# Patient Record
Sex: MALE | Race: BLACK OR AFRICAN AMERICAN | NOT HISPANIC OR LATINO | Employment: FULL TIME | URBAN - METROPOLITAN AREA
[De-identification: names, ages, dates, MRNs, and addresses within clinical notes are randomized per-mention and may not be internally consistent; named-entity substitution may affect disease eponyms.]

---

## 2017-01-10 ENCOUNTER — TELEPHONE (OUTPATIENT)
Dept: INTERNAL MEDICINE | Facility: CLINIC | Age: 52
End: 2017-01-10

## 2017-01-10 DIAGNOSIS — I10 ESSENTIAL HYPERTENSION: Primary | ICD-10-CM

## 2017-08-11 ENCOUNTER — HOSPITAL ENCOUNTER (EMERGENCY)
Facility: OTHER | Age: 52
Discharge: HOME | End: 2017-08-11
Attending: EMERGENCY MEDICINE
Payer: COMMERCIAL

## 2017-08-11 VITALS
BODY MASS INDEX: 25.9 KG/M2 | RESPIRATION RATE: 16 BRPM | SYSTOLIC BLOOD PRESSURE: 137 MMHG | WEIGHT: 165 LBS | TEMPERATURE: 98 F | DIASTOLIC BLOOD PRESSURE: 73 MMHG | HEART RATE: 66 BPM | HEIGHT: 67 IN | OXYGEN SATURATION: 97 %

## 2017-08-11 DIAGNOSIS — K50.019: Primary | ICD-10-CM

## 2017-08-11 LAB
ALBUMIN SERPL BCP-MCNC: 3.9 G/DL
ALP SERPL-CCNC: 66 U/L
ALT SERPL W/O P-5'-P-CCNC: 21 U/L
ANION GAP SERPL CALC-SCNC: 10 MMOL/L
AST SERPL-CCNC: 19 U/L
BASOPHILS # BLD AUTO: 0.01 K/UL
BASOPHILS NFR BLD: 0.1 %
BILIRUB SERPL-MCNC: 1.4 MG/DL
BILIRUB UR QL STRIP: NEGATIVE
BUN SERPL-MCNC: 12 MG/DL
CALCIUM SERPL-MCNC: 9.6 MG/DL
CHLORIDE SERPL-SCNC: 104 MMOL/L
CLARITY UR: CLEAR
CO2 SERPL-SCNC: 26 MMOL/L
COLOR UR: YELLOW
CREAT SERPL-MCNC: 1 MG/DL
CRP SERPL-MCNC: 32.6 MG/L
DIFFERENTIAL METHOD: ABNORMAL
EOSINOPHIL # BLD AUTO: 0 K/UL
EOSINOPHIL NFR BLD: 0.2 %
ERYTHROCYTE [DISTWIDTH] IN BLOOD BY AUTOMATED COUNT: 13.2 %
EST. GFR  (AFRICAN AMERICAN): >60 ML/MIN/1.73 M^2
EST. GFR  (NON AFRICAN AMERICAN): >60 ML/MIN/1.73 M^2
GLUCOSE SERPL-MCNC: 110 MG/DL
GLUCOSE UR QL STRIP: NEGATIVE
HCT VFR BLD AUTO: 47.5 %
HGB BLD-MCNC: 16.6 G/DL
HGB UR QL STRIP: ABNORMAL
KETONES UR QL STRIP: NEGATIVE
LEUKOCYTE ESTERASE UR QL STRIP: NEGATIVE
LIPASE SERPL-CCNC: 7 U/L
LYMPHOCYTES # BLD AUTO: 0.5 K/UL
LYMPHOCYTES NFR BLD: 4.1 %
MCH RBC QN AUTO: 31.5 PG
MCHC RBC AUTO-ENTMCNC: 34.9 G/DL
MCV RBC AUTO: 90 FL
MONOCYTES # BLD AUTO: 1 K/UL
MONOCYTES NFR BLD: 9.4 %
NEUTROPHILS # BLD AUTO: 9.3 K/UL
NEUTROPHILS NFR BLD: 85.9 %
NITRITE UR QL STRIP: NEGATIVE
PH UR STRIP: 6 [PH] (ref 5–8)
PLATELET # BLD AUTO: 179 K/UL
PMV BLD AUTO: 11.1 FL
POTASSIUM SERPL-SCNC: 4 MMOL/L
PROT SERPL-MCNC: 7.3 G/DL
PROT UR QL STRIP: NEGATIVE
RBC # BLD AUTO: 5.27 M/UL
SODIUM SERPL-SCNC: 140 MMOL/L
SP GR UR STRIP: 1.02 (ref 1–1.03)
URN SPEC COLLECT METH UR: ABNORMAL
UROBILINOGEN UR STRIP-ACNC: NEGATIVE EU/DL
WBC # BLD AUTO: 10.85 K/UL

## 2017-08-11 PROCEDURE — 25000003 PHARM REV CODE 250: Performed by: EMERGENCY MEDICINE

## 2017-08-11 PROCEDURE — 99284 EMERGENCY DEPT VISIT MOD MDM: CPT | Mod: 25

## 2017-08-11 PROCEDURE — 83690 ASSAY OF LIPASE: CPT

## 2017-08-11 PROCEDURE — 81003 URINALYSIS AUTO W/O SCOPE: CPT

## 2017-08-11 PROCEDURE — 85025 COMPLETE CBC W/AUTO DIFF WBC: CPT

## 2017-08-11 PROCEDURE — 63600175 PHARM REV CODE 636 W HCPCS: Performed by: EMERGENCY MEDICINE

## 2017-08-11 PROCEDURE — 25500020 PHARM REV CODE 255: Performed by: EMERGENCY MEDICINE

## 2017-08-11 PROCEDURE — 96374 THER/PROPH/DIAG INJ IV PUSH: CPT

## 2017-08-11 PROCEDURE — 80053 COMPREHEN METABOLIC PANEL: CPT

## 2017-08-11 PROCEDURE — 86140 C-REACTIVE PROTEIN: CPT

## 2017-08-11 PROCEDURE — 96375 TX/PRO/DX INJ NEW DRUG ADDON: CPT

## 2017-08-11 PROCEDURE — 96361 HYDRATE IV INFUSION ADD-ON: CPT

## 2017-08-11 RX ORDER — HYDROCODONE BITARTRATE AND ACETAMINOPHEN 5; 325 MG/1; MG/1
1 TABLET ORAL EVERY 4 HOURS PRN
Qty: 18 TABLET | Refills: 0 | Status: SHIPPED | OUTPATIENT
Start: 2017-08-11 | End: 2022-06-23

## 2017-08-11 RX ORDER — METRONIDAZOLE 500 MG/1
500 TABLET ORAL 3 TIMES DAILY
Qty: 21 TABLET | Refills: 0 | Status: SHIPPED | OUTPATIENT
Start: 2017-08-11 | End: 2017-08-18 | Stop reason: SDUPTHER

## 2017-08-11 RX ORDER — ONDANSETRON 2 MG/ML
8 INJECTION INTRAMUSCULAR; INTRAVENOUS
Status: COMPLETED | OUTPATIENT
Start: 2017-08-11 | End: 2017-08-11

## 2017-08-11 RX ORDER — MORPHINE SULFATE 2 MG/ML
4 INJECTION, SOLUTION INTRAMUSCULAR; INTRAVENOUS
Status: COMPLETED | OUTPATIENT
Start: 2017-08-11 | End: 2017-08-11

## 2017-08-11 RX ORDER — ONDANSETRON 4 MG/1
4 TABLET, ORALLY DISINTEGRATING ORAL EVERY 8 HOURS PRN
Qty: 12 TABLET | Refills: 0 | Status: SHIPPED | OUTPATIENT
Start: 2017-08-11 | End: 2022-06-23

## 2017-08-11 RX ORDER — CIPROFLOXACIN 500 MG/1
500 TABLET ORAL 2 TIMES DAILY
Qty: 14 TABLET | Refills: 0 | Status: SHIPPED | OUTPATIENT
Start: 2017-08-11 | End: 2017-08-18 | Stop reason: SDUPTHER

## 2017-08-11 RX ADMIN — MORPHINE SULFATE 4 MG: 2 INJECTION, SOLUTION INTRAMUSCULAR; INTRAVENOUS at 01:08

## 2017-08-11 RX ADMIN — ONDANSETRON 8 MG: 2 INJECTION INTRAMUSCULAR; INTRAVENOUS at 01:08

## 2017-08-11 RX ADMIN — SODIUM CHLORIDE 1000 ML: 0.9 INJECTION, SOLUTION INTRAVENOUS at 01:08

## 2017-08-11 RX ADMIN — IOHEXOL 75 ML: 350 INJECTION, SOLUTION INTRAVENOUS at 03:08

## 2017-08-11 NOTE — DISCHARGE INSTRUCTIONS
Your CAT scan showing inflammation of your ileum - this could represent a rare form of diverticulitis. It also could represent Crohn's disease, which is an inflammatory bowel disorder. You need to see a gastroenterologist to determine which of these it is. The antibiotics you were prescribed today will treat diverticulitis.

## 2017-08-11 NOTE — ED NOTES
Pt sitting in recliner. Pt watching tv. Pt states pain level 2/10. VS monitoring in progress. VS stable. Call light within reach. Cardiac monitoring in progress. Plan of care updated and discussed. Comfort needs addressed. Will continue to monitor.

## 2017-08-11 NOTE — ED TRIAGE NOTES
Pt c/o right sided abdominal pain since yesterday - states started about 0700 in the morning - states pain is constant and has progressively gotten worse - states pain 8/10 and aching in nature - states diarrhea yesterday

## 2017-08-11 NOTE — ED PROVIDER NOTES
"Encounter Date: 8/11/2017    SCRIBE #1 NOTE: I, Carrol Richard, am scribing for, and in the presence of, Dr. Curiel.       History     Chief Complaint   Patient presents with    Abdominal Pain     C/o RLQ abdominal pain onset yesterday. Denies any n/v, diarrhea, fever. States has had hx of similar symptoms, denies appendectomy.      Time seen by provider: 12:38 PM    This is a 52 y.o. male who presents with complaint of right-sided abdominal pain that has persisted since its onset yesterday morning.  The patient describes his discomfort as a constant, non-radiating sensation.  He also endorses mild nausea and 1 episode of diarrhea, which occurred prior to the onset of the pain and has since resolved.  He says there is no fever, chills, appetite changes, congestion, rhinorrhea, sore throat, cough, SOB, CP, vomiting, constipation, blood in stool, dysuria, or changes in urinary frequency or urgency.  The patient states that his discomfort is slightly worse with movement. There are no identifying, alleviating, or exacerbating factors.  He admits that he has not attempted to alleviate his discomfort with any OTC medication. Patient had a similar episode approximately 9 years ago, and he was diagnosed with an uncertain infection "near (his) appendix" at that time.  The patient reports further history of HTN and GERD.  He reports surgical history of left nephrectomy and colonoscopy.  He endorses no further complaints at this time.     Additional past medical, surgical, and social history as outlined in the nursing assessment was reviewed by me.       The history is provided by the patient.     Review of patient's allergies indicates:   Allergen Reactions    No known drug allergies      Past Medical History:   Diagnosis Date    GERD (gastroesophageal reflux disease)     Hx of unilateral nephrectomy     s/p Left Nephrectomy 12/2010/Benign cyst/inflammation    Hypertension     Tobacco abuse      Past Surgical History: "   Procedure Laterality Date    KIDNEY SURGERY  2012    kidney removed     Family History   Problem Relation Age of Onset    Hypertension Mother     Diabetes Mother     Hypertension Father      Social History   Substance Use Topics    Smoking status: Current Every Day Smoker     Packs/day: 0.50     Years: 35.00    Smokeless tobacco: Never Used      Comment: Considering such    Alcohol use No     Review of Systems   Constitutional: Negative for appetite change, chills and fever.   HENT: Negative for congestion, rhinorrhea and sore throat.    Respiratory: Negative for cough and shortness of breath.    Cardiovascular: Negative for chest pain.   Gastrointestinal: Positive for abdominal pain, diarrhea (Resolved) and nausea. Negative for blood in stool, constipation and vomiting.   Endocrine: Negative for polyuria.   Genitourinary: Negative for dysuria, frequency and urgency.   Musculoskeletal: Negative for back pain.   Skin: Negative for rash.   Allergic/Immunologic: Negative for immunocompromised state.   Neurological: Negative for dizziness and weakness.   Hematological: Does not bruise/bleed easily.   Psychiatric/Behavioral: Negative for confusion.       Physical Exam     Initial Vitals [08/11/17 1052]   BP Pulse Resp Temp SpO2   122/77 98 16 98.6 °F (37 °C) 97 %      MAP       92         Physical Exam    Nursing note and vitals reviewed.  Constitutional: Vital signs are normal. He appears well-developed and well-nourished. He is not diaphoretic. He does not appear ill. No distress.   HENT:   Head: Normocephalic and atraumatic.   Right Ear: External ear normal.   Left Ear: External ear normal.   Eyes: Conjunctivae and EOM are normal. Right eye exhibits no discharge. Left eye exhibits no discharge.   Neck: Normal range of motion. Neck supple. No tracheal deviation present.   Cardiovascular: Normal rate, regular rhythm, S1 normal, S2 normal, normal heart sounds and intact distal pulses. Exam reveals no gallop and  no friction rub.    No murmur heard.  Pulmonary/Chest: Breath sounds normal. No respiratory distress.   Abdominal: Soft. Bowel sounds are normal. He exhibits no distension and no mass. There is tenderness. There is guarding. There is no rebound.   Tenderness in the right lumbar and right iliac regions with guarding, but no rebound.     Musculoskeletal: Normal range of motion.   Normal range of motion. He exhibits no edema or tenderness.    Neurological: He is alert and oriented to person, place, and time. He has normal strength. No cranial nerve deficit. He exhibits normal muscle tone. Gait normal.   Skin: Skin is warm and dry. Capillary refill takes less than 2 seconds. No rash noted. No pallor.   Psychiatric: He has a normal mood and affect. His speech is normal. Thought content normal.         ED Course   Procedures  Labs Reviewed   URINALYSIS - Abnormal; Notable for the following:        Result Value    Occult Blood UA Trace (*)     All other components within normal limits   COMPREHENSIVE METABOLIC PANEL - Abnormal; Notable for the following:     Total Bilirubin 1.4 (*)     All other components within normal limits   CBC W/ AUTO DIFFERENTIAL - Abnormal; Notable for the following:     MCH 31.5 (*)     Gran # 9.3 (*)     Lymph # 0.5 (*)     Gran% 85.9 (*)     Lymph% 4.1 (*)     All other components within normal limits   C-REACTIVE PROTEIN - Abnormal; Notable for the following:     CRP 32.6 (*)     All other components within normal limits   LIPASE     Imaging Results          CT Abdomen Pelvis With Contrast (Final result)  Result time 08/11/17 16:11:14    Final result by Alexey Rodriguez MD (08/11/17 16:11:14)                 Impression:      1.  Inflammatory changes of the terminal ileum with probable microperforation.  Diagnostic considerations include ileal diverticulitis and less likely, inflammatory bowel disease.  2.  Thickening of the urinary bladder wall, possibly reflecting chronic outlet  obstruction.    Discussed with Dr. Curiel at 4:10 PM.      Electronically signed by: NAEL NAVA MD  Date:     08/11/17  Time:    16:11              Narrative:    CT abdomen and pelvis    Technique: Helical CT scan abdomen and pelvis performed with 75 mL Omnipaque 350 intravenous contrast.    Comparison: 6/2/08.    Findings:    Discoid atelectasis noted at the left lung base.  No pericardial or pleural effusion.    Liver demonstrates focal fatty infiltration along the falciform ligament and is otherwise unremarkable.  Gallbladder, pancreas, spleen, adrenal glands are unremarkable.  Left kidney is absent, status post left nephrectomy.  No masses or fluid collections in the operative bed.  Right kidney demonstrates several tiny cortical hypodense foci, too small to characterize.  No hydronephrosis.    GI tract is not obstructed.  Terminal ileum demonstrates wall thickening and significant inflammatory stranding.  There are several punctate foci of gas which may be extraluminal, suggesting microperforation.  No drainable abscess.  Although inflammatory stranding is present about the appendix and caliber is minimally increased at 8 mm, there is gas throughout the majority of the lumen and the wall does not appear thickened.  Diverticula are noted throughout the colon.    The urinary bladder demonstrates wall thickening though is under distended.    No retroperitoneal or mesenteric lymphadenopathy.  No ascites.  Abdominal aorta is normal caliber.    Regional osseous structures demonstrate no aggressive appearing lytic or blastic lesions.                                      Medical Decision Making:   History:   Old Medical Records: I decided to obtain old medical records.  Old Records Summarized: records from clinic visits, records from previous admission(s) and other records.  Initial Assessment:   Patient presents with RLQ pain.  There is tenderness on exam.  Differential include appendicitis, diverticulitis,  infectious colitis, and mesenteric adenitis.  Biliary tract disease is less likely.  I will Ct the abdomen.  I will give parenteral analgesia in addition to IVF.  I will reassess.   Clinical Tests:   Lab Tests: Ordered and Reviewed  Radiological Study: Ordered and Reviewed  ED Management:  4:40 PM. Patient is feeling much better and is comfortable appearing.  His CT shows terminal ileitis.  While this could be a rare diverticulitis, given his similar symptom 9 years ago I dicussed with patient the possibility of Crohn's disease.  He believes his mother may have had IBD but he is unsure. I will give antibiotics for diverticulitis but I have stressed the need to see GI for definitive diagnosis. Patient wants to go home at this time.  I have discussed with patient the diagnostic results, diagnosis, treatment plan, and need for follow-up. Patient has expressed understanding of my instructions. I am comfortable with his discharge home at this time.             Scribe Attestation:   Scribe #1: I performed the above scribed service and the documentation accurately describes the services I performed. I attest to the accuracy of the note.    Attending Attestation:           Physician Attestation for Scribe:  Physician Attestation Statement for Scribe #1: I, Dr. Curiel, reviewed documentation, as scribed by Carrol Richard in my presence, and it is both accurate and complete.                 ED Course     Clinical Impression:     1. Terminal ileitis of small intestine, unspecified complication                                 Genesis Curiel MD  08/11/17 7186

## 2017-08-11 NOTE — ED NOTES
Pt sitting in recliner. Wife at side. Pt states pain level 3/10. Restroom and comfort needs addressed. VS monitoring in progress. Plan of care updated and discussed. Will continue to monitor.

## 2017-08-18 ENCOUNTER — OFFICE VISIT (OUTPATIENT)
Dept: INTERNAL MEDICINE | Facility: CLINIC | Age: 52
End: 2017-08-18
Payer: COMMERCIAL

## 2017-08-18 VITALS
BODY MASS INDEX: 24.6 KG/M2 | WEIGHT: 156.75 LBS | DIASTOLIC BLOOD PRESSURE: 78 MMHG | HEART RATE: 84 BPM | SYSTOLIC BLOOD PRESSURE: 118 MMHG | HEIGHT: 67 IN

## 2017-08-18 DIAGNOSIS — K57.10 DIVERTICULOSIS OF SMALL INTESTINE WITHOUT HEMORRHAGE: ICD-10-CM

## 2017-08-18 DIAGNOSIS — I10 ESSENTIAL HYPERTENSION: Chronic | ICD-10-CM

## 2017-08-18 DIAGNOSIS — F17.201 TOBACCO ABUSE, IN REMISSION: Primary | ICD-10-CM

## 2017-08-18 PROCEDURE — 99999 PR PBB SHADOW E&M-EST. PATIENT-LVL III: CPT | Mod: PBBFAC,,, | Performed by: INTERNAL MEDICINE

## 2017-08-18 PROCEDURE — 3008F BODY MASS INDEX DOCD: CPT | Mod: S$GLB,,, | Performed by: INTERNAL MEDICINE

## 2017-08-18 PROCEDURE — 3078F DIAST BP <80 MM HG: CPT | Mod: S$GLB,,, | Performed by: INTERNAL MEDICINE

## 2017-08-18 PROCEDURE — 3074F SYST BP LT 130 MM HG: CPT | Mod: S$GLB,,, | Performed by: INTERNAL MEDICINE

## 2017-08-18 PROCEDURE — 99214 OFFICE O/P EST MOD 30 MIN: CPT | Mod: S$GLB,,, | Performed by: INTERNAL MEDICINE

## 2017-08-18 RX ORDER — CIPROFLOXACIN 500 MG/1
500 TABLET ORAL 2 TIMES DAILY
Qty: 14 TABLET | Refills: 0 | Status: SHIPPED | OUTPATIENT
Start: 2017-08-18 | End: 2017-08-25

## 2017-08-18 RX ORDER — METRONIDAZOLE 500 MG/1
500 TABLET ORAL EVERY 12 HOURS
Qty: 14 TABLET | Refills: 0 | Status: SHIPPED | OUTPATIENT
Start: 2017-08-18 | End: 2017-08-25

## 2017-08-18 NOTE — PATIENT INSTRUCTIONS
Diverticulitis    Some people get pouches along the wall of the colon as they get older. The pouches, called diverticuli, usually cause no symptoms. If the pouches become blocked, you can get an infection. This infection is called diverticulitis. It causes pain in your lower abdomen and fever. If not treated, it can become a serious condition, causing an abscess to form inside the pouch. The abscess may block the intestinal tract even or rupture, spreading infection throughout the abdomen.  When treatment is started early, oral antibiotics alone may be enough to cure diverticulitis. This method is tried first. But, if you don't improve or if your condition gets worse while using oral antibiotics, you may need to be admitted to the hospital for IV antibiotics. Severe cases may require surgery.  Home care  The following guidelines will help you care for yourself at home:  · During the acute illness, rest and follow your healthcare provider's instructions about diet. Sometimes you will need to follow a clear liquid diet to rest your bowel. Once your symptoms are better, you may be told to follow a low-fiber diet for some time. Include foods like:  ¨ Flake cereal, mashed potatoes, pancakes, waffles, pasta, white bread, rice, applesauce, bananas, eggs, fish, poultry, tofu, and cooked soft vegetables  · Take antibiotics exactly as instructed. Don't miss any doses or stop taking the medication, even if you feel better.  · Monitor your temperature and tell your healthcare provider if you have rising temperatures.  Preventing future attacks  Once you have an episode of diverticulitis, you are at risk for having it again. After you have recovered from this episode, you may be able to lower your risk by eating a high-fiber diet (20 gm/day to 35 gm/day of fiber). This cleans out the colon pouches that already exist and may prevent new ones from forming. Foods high in fiber include fresh fruits and edible peelings, raw or  lightly cooked vegetables, whole grain cereals and breads, dried beans and peas, and bran.  Other steps that can help prevent future attacks include:  · Take your medicines, such as antibiotics, as your healthcare provider says.  · Drink 6 to 8 glasses of water every day, unless told otherwise.  · Use a heating pad or hot water bottle to help abdominal cramping or pain.  · Begin an exercise program. Ask your healthcare provider how to get started. You can benefit from simple activities such as walking or gardening.  · Treat diarrhea with a bland diet. Start with liquids only; then slowly add fiber over time.  · Watch for changes in your bowel movements (constipation to diarrhea). Avoid constipation by eating a high fiber diet and taking a stool softener if needed.  · Get plenty of rest and sleep.  Follow-up care  Follow up with your healthcare provider as advised or sooner if you are not getting better in the next 2 days.  When to seek medical advice  Call your healthcare provider right away if any of these occur:  · Fever of 100.4°F (38°C) or higher, or as directed by your healthcare provider  · Repeated vomiting or swelling of the abdomen  · Weakness, dizziness, light-headedness  · Pain in your abdomen that gets worse, severe, or spreads to your back  · Pain that moves to the right lower abdomen  · Rectal bleeding (stools that are red, black or maroon color)  · Unexpected vaginal bleeding  Date Last Reviewed: 9/1/2016  © 3645-0428 [employer]. [address] [place] [address], [place] [address]. All rights reserved. This information is not intended as a substitute for professional medical care. Always follow your healthcare professional's instructions.

## 2017-08-19 NOTE — PROGRESS NOTES
Subjective:       Patient ID: Kevin Urbina is a 52 y.o. male.    Chief Complaint: Hospital Follow up Diverticulitis    HPI Pt presents s/p ER visit with RLQ abdomen pain 8/11.  He was dx'd with Terminal ileitis/diverticulitis  After Ct Abdomen/lab/UA performed. He was given a 1 week coarse of Flagyl/Ciprofloxacin, which he has taken.  He is better but still has some occasional discomfort RLQ; he has no F/C,no N/V, no diarrhea.    Current Outpatient Prescriptions   Medication Sig Dispense Refill    ciprofloxacin HCl (CIPRO) 500 MG tablet Take 1 tablet (500 mg total) by mouth 2 (two) times daily. 14 tablet 0    hydrocodone-acetaminophen 5-325mg (NORCO) 5-325 mg per tablet Take 1 tablet by mouth every 4 (four) hours as needed for Pain. 18 tablet 0    metronidazole (FLAGYL) 500 MG tablet Take 1 tablet (500 mg total) by mouth every 12 (twelve) hours. 14 tablet 0    ondansetron (ZOFRAN-ODT) 4 MG TbDL Take 1 tablet (4 mg total) by mouth every 8 (eight) hours as needed (nausea). 12 tablet 0    wheat dextrin (BENEFIBER CLEAR SF, DEXTRIN,) 3 gram/3.5 gram PwPk 1 packet daily in 6-8 oz fluid/water daily for Constipation 30 packet prn     No current facility-administered medications for this visit.        Review of Systems   Constitutional: Negative for chills and fever.   Gastrointestinal: Positive for abdominal pain.        +/See HPI   Neurological: Negative for weakness.       Objective:      Lab Results   Component Value Date    WBC 10.85 08/11/2017    HGB 16.6 08/11/2017    HCT 47.5 08/11/2017     08/11/2017    CHOL 193 05/26/2016    TRIG 179 (H) 04/08/2015    HDL 38 (L) 04/08/2015    ALT 21 08/11/2017    AST 19 08/11/2017     08/11/2017    K 4.0 08/11/2017     08/11/2017    CREATININE 1.0 08/11/2017    BUN 12 08/11/2017    CO2 26 08/11/2017    TSH 1.822 05/26/2016    PSA 0.44 05/26/2016    INR 1.0 11/18/2010    HGBA1C 5.0 05/26/2016     Physical Exam   Constitutional: He appears well-developed  and well-nourished.   HENT:   Head: Normocephalic and atraumatic.   Cardiovascular: Normal rate, regular rhythm and normal heart sounds.    Pulmonary/Chest: Effort normal and breath sounds normal.   Abdominal: Soft. Bowel sounds are normal. There is no tenderness. There is guarding.   Musculoskeletal: He exhibits no edema.   Skin: Skin is warm and dry.   Vitals reviewed.      Assessment:       1. Tobacco abuse, in remission    2. Diverticulosis of small intestine without hemorrhage    3. Essential hypertension        Plan:     Health Maintenance       Date Due Completion Date    Pneumococcal PPSV23 (Medium Risk) (1) 03/01/1981 ---    Influenza Vaccine 08/01/2017 ---    Lipid Panel 05/26/2021 5/26/2016    Colonoscopy 03/25/2024 3/25/2014    TETANUS VACCINE 04/08/2025 4/8/2015        Kevin was seen today for hospital follow up and diverticulitis.    Diagnoses and all orders for this visit:      Diverticulosis of small intestine without hemorrhage/improved-not resolved  -     ciprofloxacin HCl (CIPRO) 500 MG tablet; Take 1 tablet (500 mg total) by mouth 2 (two) times daily x add'l week.  -     metronidazole (FLAGYL) 500 MG tablet; Take 1 tablet (500 mg total) by mouth every 12 (twelve) hours x add'l week.  -     wheat dextrin (BENEFIBER CLEAR SF, DEXTRIN,) 3 gram/3.5 gram PwPk; 1 packet daily in 6-8 oz fluid/water daily for Constipation        -     Diverticulosis diet discussed at length and info provided    Essential hypertension/controlled with diet    Tobacco abuse        -    Tobacco cessation discussed at length/pt agreeable to trial of D/C

## 2018-05-09 DIAGNOSIS — M25.569 KNEE PAIN, UNSPECIFIED CHRONICITY, UNSPECIFIED LATERALITY: Primary | ICD-10-CM

## 2018-07-12 ENCOUNTER — TELEPHONE (OUTPATIENT)
Dept: INTERNAL MEDICINE | Facility: CLINIC | Age: 53
End: 2018-07-12

## 2018-07-12 NOTE — TELEPHONE ENCOUNTER
Jimmy Foy Staff   Caller: self/[phone] (Today,  1:09 PM)             Pt is calling to try and get an apt with Dr.St Sánchez. Pt is still having pain in his right knee and a bad cough. Please advise.

## 2019-05-22 ENCOUNTER — OFFICE VISIT (OUTPATIENT)
Dept: INTERNAL MEDICINE | Facility: CLINIC | Age: 54
End: 2019-05-22
Payer: COMMERCIAL

## 2019-05-22 VITALS
HEIGHT: 67 IN | WEIGHT: 159.63 LBS | SYSTOLIC BLOOD PRESSURE: 150 MMHG | TEMPERATURE: 98 F | DIASTOLIC BLOOD PRESSURE: 100 MMHG | OXYGEN SATURATION: 98 % | HEART RATE: 90 BPM | BODY MASS INDEX: 25.06 KG/M2

## 2019-05-22 DIAGNOSIS — Z00.00 ANNUAL PHYSICAL EXAM: Primary | ICD-10-CM

## 2019-05-22 DIAGNOSIS — K63.5 POLYP OF COLON, UNSPECIFIED PART OF COLON, UNSPECIFIED TYPE: ICD-10-CM

## 2019-05-22 DIAGNOSIS — F17.201 TOBACCO ABUSE, IN REMISSION: ICD-10-CM

## 2019-05-22 DIAGNOSIS — I10 ESSENTIAL HYPERTENSION: Chronic | ICD-10-CM

## 2019-05-22 DIAGNOSIS — K13.79 LESION OF SOFT PALATE: ICD-10-CM

## 2019-05-22 PROCEDURE — 99396 PREV VISIT EST AGE 40-64: CPT | Mod: S$GLB,,, | Performed by: INTERNAL MEDICINE

## 2019-05-22 PROCEDURE — 99999 PR PBB SHADOW E&M-EST. PATIENT-LVL IV: ICD-10-PCS | Mod: PBBFAC,,, | Performed by: INTERNAL MEDICINE

## 2019-05-22 PROCEDURE — 99396 PR PREVENTIVE VISIT,EST,40-64: ICD-10-PCS | Mod: S$GLB,,, | Performed by: INTERNAL MEDICINE

## 2019-05-22 PROCEDURE — 99999 PR PBB SHADOW E&M-EST. PATIENT-LVL IV: CPT | Mod: PBBFAC,,, | Performed by: INTERNAL MEDICINE

## 2019-05-22 PROCEDURE — 3080F DIAST BP >= 90 MM HG: CPT | Mod: CPTII,S$GLB,, | Performed by: INTERNAL MEDICINE

## 2019-05-22 PROCEDURE — 3080F PR MOST RECENT DIASTOLIC BLOOD PRESSURE >= 90 MM HG: ICD-10-PCS | Mod: CPTII,S$GLB,, | Performed by: INTERNAL MEDICINE

## 2019-05-22 PROCEDURE — 3077F SYST BP >= 140 MM HG: CPT | Mod: CPTII,S$GLB,, | Performed by: INTERNAL MEDICINE

## 2019-05-22 PROCEDURE — 3077F PR MOST RECENT SYSTOLIC BLOOD PRESSURE >= 140 MM HG: ICD-10-PCS | Mod: CPTII,S$GLB,, | Performed by: INTERNAL MEDICINE

## 2019-05-22 RX ORDER — LOSARTAN POTASSIUM 50 MG/1
50 TABLET ORAL DAILY
Qty: 30 TABLET | Refills: 3 | Status: SHIPPED | OUTPATIENT
Start: 2019-05-22 | End: 2022-06-23

## 2019-05-22 RX ORDER — TRIAMCINOLONE ACETONIDE 1 MG/G
PASTE DENTAL 2 TIMES DAILY
Qty: 5 G | Refills: 0 | Status: SHIPPED | OUTPATIENT
Start: 2019-05-22 | End: 2019-06-21

## 2019-05-22 NOTE — PROGRESS NOTES
Subjective:       Patient ID: Kevin Urbina is a 54 y.o. male.    Chief Complaint:   Annual Exam    HPI: Mr Urbina for annual f/u HTN. He is still smoking but is soon to try and quit with medication tx from  Dr Porras, a local psychologist and friend. He will message with name of the medication.  He does have an area of irritation/soft palate x last 1-2 weeks. Otherwise, he is good-no complaints.    Past Medical, Surgical, Social History: Please see as stated in Epic chart which has been reviewed.    Current Outpatient Medications   Medication Sig Dispense Refill    hydrocodone-acetaminophen 5-325mg (NORCO) 5-325 mg per tablet Take 1 tablet by mouth every 4 (four) hours as needed for Pain. 18 tablet 0    losartan (COZAAR) 50 MG tablet Take 1 tablet (50 mg total) by mouth once daily. 30 tablet 3    ondansetron (ZOFRAN-ODT) 4 MG TbDL Take 1 tablet (4 mg total) by mouth every 8 (eight) hours as needed (nausea). 12 tablet 0    triamcinolone acetonide 0.1% (KENALOG) 0.1 % paste Place onto teeth 2 (two) times daily. Apply paste to area of dental/palate inflammtion 2x/day x 1-2 weeks 5 g 0    wheat dextrin (BENEFIBER CLEAR SF, DEXTRIN,) 3 gram/3.5 gram PwPk 1 packet daily in 6-8 oz fluid/water daily for Constipation 30 packet prn     No current facility-administered medications for this visit.        Review of Systems   HENT: Positive for mouth sores.         +1 Soft palate small area of swelling/discomfort-no trauma and no ulceration   All other systems reviewed and are negative.      Objective:      Lab Results   Component Value Date    WBC 10.85 08/11/2017    HGB 16.6 08/11/2017    HCT 47.5 08/11/2017     08/11/2017    CHOL 193 05/26/2016    TRIG 179 (H) 04/08/2015    HDL 38 (L) 04/08/2015    ALT 21 08/11/2017    AST 19 08/11/2017     08/11/2017    K 4.0 08/11/2017     08/11/2017    CREATININE 1.0 08/11/2017    BUN 12 08/11/2017    CO2 26 08/11/2017    TSH 1.822 05/26/2016    PSA 0.44  05/26/2016    INR 1.0 11/18/2010    HGBA1C 5.0 05/26/2016     Physical Exam   Constitutional: He is oriented to person, place, and time. He appears well-developed and well-nourished.   HENT:   Anterior soft palate shows a small flesh colored 2-3mm area of mildly swollen mucosa-no ulceration,no mass   Neck: Normal range of motion. Neck supple. No thyromegaly present.   Cardiovascular: Normal rate, regular rhythm, normal heart sounds and intact distal pulses.   Pulmonary/Chest: Effort normal and breath sounds normal.   Abdominal: Soft. Bowel sounds are normal. He exhibits no mass. There is no tenderness.   Musculoskeletal: He exhibits deformity.   Left leg shows atrophy of gastrocnemius/lowere extremity  Muscles s/p congenital birth defect   Lymphadenopathy:     He has no cervical adenopathy.   Neurological: He is alert and oriented to person, place, and time.   Skin: Skin is warm and dry.   Psychiatric: He has a normal mood and affect. His behavior is normal. Thought content normal.       Assessment:       1. Annual physical exam    2. Essential hypertension    3. Polyp of colon, unspecified part of colon, unspecified type    4. Tobacco abuse, in remission    5. Lesion of soft palate        Plan:     Health Maintenance   Topic Date Due    Pneumococcal Vaccine (Medium Risk) (1 of 1 - PPSV23) 03/01/1982    Influenza Vaccine  08/01/2019    Lipid Panel  05/26/2021    Colonoscopy  03/25/2024    TETANUS VACCINE  04/08/2025    Hepatitis C Screening  Completed        Kevin was seen today for annual exam.    Diagnoses and all orders for this visit:    Annual physical exam  -     PSA, Screening; Future  -     'Have encouraged pt to decrease his intake of Etoh    Essential hypertension/uncontrolled        -     Start Losartan 50mg QD  -     CBC auto differential; Future  -     Comprehensive metabolic panel; Future  -     Lipid panel; Future  -     TSH; Future  -     RTC x 3 months f/u    Polyp of colon, unspecified part  of colon, unspecified type  -     Case request GI: COLONOSCOPY    Tobacco abuse, in remission        -     Pt to call with name of newly prescribed smoking cessation tx    Lesion of soft palate c/w inflammation/trauma  -     Triamcinolone acetonide 0.1% (KENALOG) 0.1 % paste; Place onto teeth 2 (two) times daily. Apply paste to area of dental/palate inflammtion 2x/day x 1-2 weeks        -     If no resolution of lesion x 1-2 weeks, pt to see his Dentist for further evaluation

## 2020-02-03 ENCOUNTER — TELEPHONE (OUTPATIENT)
Dept: INTERNAL MEDICINE | Facility: CLINIC | Age: 55
End: 2020-02-03

## 2020-02-03 DIAGNOSIS — Z29.9 PREVENTIVE MEASURE: Primary | ICD-10-CM

## 2020-06-18 ENCOUNTER — PATIENT OUTREACH (OUTPATIENT)
Dept: ADMINISTRATIVE | Facility: HOSPITAL | Age: 55
End: 2020-06-18

## 2020-06-26 ENCOUNTER — LAB VISIT (OUTPATIENT)
Dept: LAB | Facility: HOSPITAL | Age: 55
End: 2020-06-26
Payer: COMMERCIAL

## 2020-06-26 DIAGNOSIS — Z29.9 PREVENTIVE MEASURE: ICD-10-CM

## 2020-06-26 LAB
ALBUMIN SERPL BCP-MCNC: 3.6 G/DL (ref 3.5–5.2)
ALP SERPL-CCNC: 65 U/L (ref 55–135)
ALT SERPL W/O P-5'-P-CCNC: 28 U/L (ref 10–44)
ANION GAP SERPL CALC-SCNC: 8 MMOL/L (ref 8–16)
AST SERPL-CCNC: 22 U/L (ref 10–40)
BASOPHILS # BLD AUTO: 0.01 K/UL (ref 0–0.2)
BASOPHILS NFR BLD: 0.2 % (ref 0–1.9)
BILIRUB SERPL-MCNC: 0.8 MG/DL (ref 0.1–1)
BUN SERPL-MCNC: 13 MG/DL (ref 6–20)
CALCIUM SERPL-MCNC: 9.2 MG/DL (ref 8.7–10.5)
CHLORIDE SERPL-SCNC: 106 MMOL/L (ref 95–110)
CHOLEST SERPL-MCNC: 167 MG/DL (ref 120–199)
CHOLEST/HDLC SERPL: 3.5 {RATIO} (ref 2–5)
CO2 SERPL-SCNC: 26 MMOL/L (ref 23–29)
COMPLEXED PSA SERPL-MCNC: 0.33 NG/ML (ref 0–4)
CREAT SERPL-MCNC: 1.1 MG/DL (ref 0.5–1.4)
DIFFERENTIAL METHOD: ABNORMAL
EOSINOPHIL # BLD AUTO: 0.1 K/UL (ref 0–0.5)
EOSINOPHIL NFR BLD: 0.8 % (ref 0–8)
ERYTHROCYTE [DISTWIDTH] IN BLOOD BY AUTOMATED COUNT: 12.4 % (ref 11.5–14.5)
EST. GFR  (AFRICAN AMERICAN): >60 ML/MIN/1.73 M^2
EST. GFR  (NON AFRICAN AMERICAN): >60 ML/MIN/1.73 M^2
GLUCOSE SERPL-MCNC: 97 MG/DL (ref 70–110)
HCT VFR BLD AUTO: 44.4 % (ref 40–54)
HDLC SERPL-MCNC: 48 MG/DL (ref 40–75)
HDLC SERPL: 28.7 % (ref 20–50)
HGB BLD-MCNC: 14.4 G/DL (ref 14–18)
IMM GRANULOCYTES # BLD AUTO: 0.08 K/UL (ref 0–0.04)
IMM GRANULOCYTES NFR BLD AUTO: 1.3 % (ref 0–0.5)
LDLC SERPL CALC-MCNC: 96.6 MG/DL (ref 63–159)
LYMPHOCYTES # BLD AUTO: 1.1 K/UL (ref 1–4.8)
LYMPHOCYTES NFR BLD: 17.7 % (ref 18–48)
MCH RBC QN AUTO: 30.1 PG (ref 27–31)
MCHC RBC AUTO-ENTMCNC: 32.4 G/DL (ref 32–36)
MCV RBC AUTO: 93 FL (ref 82–98)
MONOCYTES # BLD AUTO: 0.7 K/UL (ref 0.3–1)
MONOCYTES NFR BLD: 10.8 % (ref 4–15)
NEUTROPHILS # BLD AUTO: 4.2 K/UL (ref 1.8–7.7)
NEUTROPHILS NFR BLD: 69.2 % (ref 38–73)
NONHDLC SERPL-MCNC: 119 MG/DL
NRBC BLD-RTO: 0 /100 WBC
PLATELET # BLD AUTO: 199 K/UL (ref 150–350)
PMV BLD AUTO: 12.4 FL (ref 9.2–12.9)
POTASSIUM SERPL-SCNC: 4 MMOL/L (ref 3.5–5.1)
PROT SERPL-MCNC: 6.6 G/DL (ref 6–8.4)
RBC # BLD AUTO: 4.79 M/UL (ref 4.6–6.2)
SODIUM SERPL-SCNC: 140 MMOL/L (ref 136–145)
TRIGL SERPL-MCNC: 112 MG/DL (ref 30–150)
TSH SERPL DL<=0.005 MIU/L-ACNC: 1.65 UIU/ML (ref 0.4–4)
WBC # BLD AUTO: 6.09 K/UL (ref 3.9–12.7)

## 2020-06-26 PROCEDURE — 36415 COLL VENOUS BLD VENIPUNCTURE: CPT

## 2020-06-26 PROCEDURE — 80053 COMPREHEN METABOLIC PANEL: CPT

## 2020-06-26 PROCEDURE — 85025 COMPLETE CBC W/AUTO DIFF WBC: CPT

## 2020-06-26 PROCEDURE — 84153 ASSAY OF PSA TOTAL: CPT

## 2020-06-26 PROCEDURE — 84443 ASSAY THYROID STIM HORMONE: CPT

## 2020-06-26 PROCEDURE — 80061 LIPID PANEL: CPT

## 2020-07-09 ENCOUNTER — PATIENT MESSAGE (OUTPATIENT)
Dept: INTERNAL MEDICINE | Facility: CLINIC | Age: 55
End: 2020-07-09

## 2020-10-07 ENCOUNTER — PATIENT MESSAGE (OUTPATIENT)
Dept: ADMINISTRATIVE | Facility: HOSPITAL | Age: 55
End: 2020-10-07

## 2020-10-13 ENCOUNTER — TELEPHONE (OUTPATIENT)
Dept: INTERNAL MEDICINE | Facility: CLINIC | Age: 55
End: 2020-10-13

## 2020-10-13 NOTE — TELEPHONE ENCOUNTER
I called the patient to assist with getting him scheduled for his annual and to get a updated blood pressure. L/M

## 2021-01-04 ENCOUNTER — PATIENT MESSAGE (OUTPATIENT)
Dept: ADMINISTRATIVE | Facility: HOSPITAL | Age: 56
End: 2021-01-04

## 2022-01-20 ENCOUNTER — PATIENT MESSAGE (OUTPATIENT)
Dept: ADMINISTRATIVE | Facility: OTHER | Age: 57
End: 2022-01-20
Payer: COMMERCIAL

## 2022-01-26 ENCOUNTER — PATIENT MESSAGE (OUTPATIENT)
Dept: ADMINISTRATIVE | Facility: HOSPITAL | Age: 57
End: 2022-01-26
Payer: COMMERCIAL

## 2022-06-23 ENCOUNTER — OFFICE VISIT (OUTPATIENT)
Dept: INTERNAL MEDICINE | Facility: CLINIC | Age: 57
End: 2022-06-23
Payer: COMMERCIAL

## 2022-06-23 DIAGNOSIS — I10 PRIMARY HYPERTENSION: ICD-10-CM

## 2022-06-23 DIAGNOSIS — R10.32 GROIN PAIN, LEFT: ICD-10-CM

## 2022-06-23 DIAGNOSIS — Z72.0 TOBACCO ABUSE: ICD-10-CM

## 2022-06-23 DIAGNOSIS — Z12.11 COLON CANCER SCREENING: ICD-10-CM

## 2022-06-23 DIAGNOSIS — Z00.00 ANNUAL PHYSICAL EXAM: Primary | ICD-10-CM

## 2022-06-23 PROCEDURE — 99396 PR PREVENTIVE VISIT,EST,40-64: ICD-10-PCS | Mod: S$GLB,,, | Performed by: INTERNAL MEDICINE

## 2022-06-23 PROCEDURE — 3008F PR BODY MASS INDEX (BMI) DOCUMENTED: ICD-10-PCS | Mod: CPTII,S$GLB,, | Performed by: INTERNAL MEDICINE

## 2022-06-23 PROCEDURE — 99396 PREV VISIT EST AGE 40-64: CPT | Mod: S$GLB,,, | Performed by: INTERNAL MEDICINE

## 2022-06-23 PROCEDURE — 1159F PR MEDICATION LIST DOCUMENTED IN MEDICAL RECORD: ICD-10-PCS | Mod: CPTII,S$GLB,, | Performed by: INTERNAL MEDICINE

## 2022-06-23 PROCEDURE — 3008F BODY MASS INDEX DOCD: CPT | Mod: CPTII,S$GLB,, | Performed by: INTERNAL MEDICINE

## 2022-06-23 PROCEDURE — 1159F MED LIST DOCD IN RCRD: CPT | Mod: CPTII,S$GLB,, | Performed by: INTERNAL MEDICINE

## 2022-06-23 PROCEDURE — 3078F DIAST BP <80 MM HG: CPT | Mod: CPTII,S$GLB,, | Performed by: INTERNAL MEDICINE

## 2022-06-23 PROCEDURE — 3077F SYST BP >= 140 MM HG: CPT | Mod: CPTII,S$GLB,, | Performed by: INTERNAL MEDICINE

## 2022-06-23 PROCEDURE — 99999 PR PBB SHADOW E&M-EST. PATIENT-LVL III: ICD-10-PCS | Mod: PBBFAC,,, | Performed by: INTERNAL MEDICINE

## 2022-06-23 PROCEDURE — 3077F PR MOST RECENT SYSTOLIC BLOOD PRESSURE >= 140 MM HG: ICD-10-PCS | Mod: CPTII,S$GLB,, | Performed by: INTERNAL MEDICINE

## 2022-06-23 PROCEDURE — 99999 PR PBB SHADOW E&M-EST. PATIENT-LVL III: CPT | Mod: PBBFAC,,, | Performed by: INTERNAL MEDICINE

## 2022-06-23 PROCEDURE — 3078F PR MOST RECENT DIASTOLIC BLOOD PRESSURE < 80 MM HG: ICD-10-PCS | Mod: CPTII,S$GLB,, | Performed by: INTERNAL MEDICINE

## 2022-06-23 NOTE — PROGRESS NOTES
Subjective:       Patient ID: Kevin Urbina is a 57 y.o. male.    Chief Complaint:   Annual Exam and Leg Pain (Left leg 6-10 pain)    HPI: Mr Urbina presents today for annual wellness  Leg Pain:  He c/o left thigh pain which started this morning; the pain is positional.No trauma,but seems to be deep in the left thigh. He has no CP/SOB and No F/C      Past Medical, Surgical, Social History: Please see as stated in Epic chart which has been reviewed.    Current Outpatient Medications   Medication Sig Dispense Refill    wheat dextrin (BENEFIBER CLEAR SF, DEXTRIN,) 3 gram/3.5 gram PwPk 1 packet daily in 6-8 oz fluid/water daily for Constipation (Patient not taking: Reported on 6/23/2022) 30 packet prn     No current facility-administered medications for this visit.       Review of Systems   Musculoskeletal: Positive for myalgias.        Left Leg pain   All other systems reviewed and are negative.      Objective:      Lab Results   Component Value Date    WBC 6.09 06/26/2020    HGB 14.4 06/26/2020    HCT 44.4 06/26/2020     06/26/2020    CHOL 167 06/26/2020    TRIG 112 06/26/2020    HDL 48 06/26/2020    ALT 28 06/26/2020    AST 22 06/26/2020     06/26/2020    K 4.0 06/26/2020     06/26/2020    CREATININE 1.1 06/26/2020    BUN 13 06/26/2020    CO2 26 06/26/2020    TSH 1.651 06/26/2020    PSA 0.33 06/26/2020    INR 1.0 11/18/2010    HGBA1C 5.0 05/26/2016     Physical Exam  Vitals reviewed.   Constitutional:       Appearance: He is well-developed.   HENT:      Head: Normocephalic and atraumatic.      Mouth/Throat:      Pharynx: No oropharyngeal exudate.   Eyes:      Conjunctiva/sclera: Conjunctivae normal.   Neck:      Thyroid: No thyromegaly.      Vascular: No JVD.      Comments: No masses    Cardiovascular:      Rate and Rhythm: Normal rate and regular rhythm.      Heart sounds: No murmur heard.    No gallop.   Pulmonary:      Effort: Pulmonary effort is normal. No respiratory distress.      Breath  "sounds: No wheezing.      Comments: + Course breath sounds throughout but no wheezing/good excursions  Chest:      Chest wall: No tenderness.   Abdominal:      General: Bowel sounds are normal. There is no distension.      Palpations: Abdomen is soft. There is no mass.      Tenderness: There is no abdominal tenderness.      Comments: No Organomegaly   Musculoskeletal:         General: Tenderness and deformity present. Normal range of motion.      Cervical back: Normal range of motion and neck supple.      Comments: + Mild tenderness of left groin with left hip having decreased ROM/chronic 2ndary to childhood injury   Lymphadenopathy:      Cervical: No cervical adenopathy.   Skin:     General: Skin is warm and dry.   Neurological:      Mental Status: He is alert and oriented to person, place, and time.      Cranial Nerves: No cranial nerve deficit.   Psychiatric:         Judgment: Judgment normal.           Vital Signs  Pulse: 81  SpO2: 96 %  BP: (!) 140/76  BP Location: Right arm  Patient Position: Sitting  Pain Score:   6  Pain Loc: Leg  Height and Weight  Height: 5' 7" (170.2 cm)  Weight: 73.6 kg (162 lb 4.1 oz)  BSA (Calculated - sq m): 1.87 sq meters  BMI (Calculated): 25.4  Weight in (lb) to have BMI = 25: 159.3]    Assessment:       1. Annual physical exam    2. Groin pain, left    3. Primary hypertension    4. Tobacco abuse    5. Colon cancer screening        Plan:     Health Maintenance   Topic Date Due    TETANUS VACCINE  04/08/2025    Lipid Panel  06/26/2025    Hepatitis C Screening  Completed        Kevin was seen today for annual exam and leg pain.    Diagnoses and all orders for this visit:    Annual physical exam  -     CBC Auto Differential; Future  -     Comprehensive Metabolic Panel; Future  -     Lipid Panel; Future  -     PSA, Screening; Future  -     TSH; Future    Groin pain, left/Rule Out DVT  -     CV Ultrasound doppler venous legs bilat; Future    Primary hypertension/acceptable control " with healthy diet/decreased Etoh intake        -     continue to monitor    Tobacco abuse        -     Tobacco cessation discussed/encourgaed; Pt not ready to quit such    Colon cancer screening/status post colonoscopy March 2014 positive for diverticulosis only  -     Case Request Endoscopy: COLONOSCOPY

## 2022-06-25 VITALS
WEIGHT: 162.25 LBS | HEART RATE: 81 BPM | SYSTOLIC BLOOD PRESSURE: 140 MMHG | OXYGEN SATURATION: 96 % | HEIGHT: 67 IN | DIASTOLIC BLOOD PRESSURE: 76 MMHG | BODY MASS INDEX: 25.47 KG/M2

## 2022-10-11 ENCOUNTER — TELEPHONE (OUTPATIENT)
Dept: INTERNAL MEDICINE | Facility: CLINIC | Age: 57
End: 2022-10-11
Payer: COMMERCIAL

## 2022-10-11 ENCOUNTER — HOSPITAL ENCOUNTER (EMERGENCY)
Facility: OTHER | Age: 57
Discharge: PSYCHIATRIC OR SUBSTANCE USE FACILITY | End: 2022-10-12
Attending: EMERGENCY MEDICINE
Payer: COMMERCIAL

## 2022-10-11 DIAGNOSIS — F23 ACUTE PSYCHOSIS: Primary | ICD-10-CM

## 2022-10-11 DIAGNOSIS — R45.1 AGITATION: ICD-10-CM

## 2022-10-11 LAB
ALBUMIN SERPL BCP-MCNC: 4 G/DL (ref 3.5–5.2)
ALP SERPL-CCNC: 73 U/L (ref 55–135)
ALT SERPL W/O P-5'-P-CCNC: 32 U/L (ref 10–44)
AMMONIA PLAS-SCNC: 58 UMOL/L (ref 10–50)
ANION GAP SERPL CALC-SCNC: 11 MMOL/L (ref 8–16)
AST SERPL-CCNC: 32 U/L (ref 10–40)
BASOPHILS # BLD AUTO: 0.05 K/UL (ref 0–0.2)
BASOPHILS NFR BLD: 0.8 % (ref 0–1.9)
BILIRUB SERPL-MCNC: 1.2 MG/DL (ref 0.1–1)
BUN SERPL-MCNC: 18 MG/DL (ref 6–20)
CALCIUM SERPL-MCNC: 9.4 MG/DL (ref 8.7–10.5)
CHLORIDE SERPL-SCNC: 107 MMOL/L (ref 95–110)
CO2 SERPL-SCNC: 22 MMOL/L (ref 23–29)
CREAT SERPL-MCNC: 1.4 MG/DL (ref 0.5–1.4)
CTP QC/QA: YES
DIFFERENTIAL METHOD: ABNORMAL
EOSINOPHIL # BLD AUTO: 0.1 K/UL (ref 0–0.5)
EOSINOPHIL NFR BLD: 1.7 % (ref 0–8)
ERYTHROCYTE [DISTWIDTH] IN BLOOD BY AUTOMATED COUNT: 13.1 % (ref 11.5–14.5)
EST. GFR  (NO RACE VARIABLE): 58 ML/MIN/1.73 M^2
ETHANOL SERPL-MCNC: <10 MG/DL
GLUCOSE SERPL-MCNC: 99 MG/DL (ref 70–110)
HCT VFR BLD AUTO: 49.9 % (ref 40–54)
HGB BLD-MCNC: 17.8 G/DL (ref 14–18)
IMM GRANULOCYTES # BLD AUTO: 0.04 K/UL (ref 0–0.04)
IMM GRANULOCYTES NFR BLD AUTO: 0.6 % (ref 0–0.5)
LYMPHOCYTES # BLD AUTO: 1.8 K/UL (ref 1–4.8)
LYMPHOCYTES NFR BLD: 27.2 % (ref 18–48)
MCH RBC QN AUTO: 31.2 PG (ref 27–31)
MCHC RBC AUTO-ENTMCNC: 35.7 G/DL (ref 32–36)
MCV RBC AUTO: 87 FL (ref 82–98)
MONOCYTES # BLD AUTO: 0.7 K/UL (ref 0.3–1)
MONOCYTES NFR BLD: 11.2 % (ref 4–15)
NEUTROPHILS # BLD AUTO: 3.8 K/UL (ref 1.8–7.7)
NEUTROPHILS NFR BLD: 58.5 % (ref 38–73)
NRBC BLD-RTO: 0 /100 WBC
PLATELET # BLD AUTO: 256 K/UL (ref 150–450)
PMV BLD AUTO: 10.5 FL (ref 9.2–12.9)
POTASSIUM SERPL-SCNC: 3.9 MMOL/L (ref 3.5–5.1)
PROT SERPL-MCNC: 7.3 G/DL (ref 6–8.4)
RBC # BLD AUTO: 5.71 M/UL (ref 4.6–6.2)
SARS-COV-2 RDRP RESP QL NAA+PROBE: NEGATIVE
SODIUM SERPL-SCNC: 140 MMOL/L (ref 136–145)
TSH SERPL DL<=0.005 MIU/L-ACNC: 3.15 UIU/ML (ref 0.4–4)
WBC # BLD AUTO: 6.51 K/UL (ref 3.9–12.7)

## 2022-10-11 PROCEDURE — 99285 EMERGENCY DEPT VISIT HI MDM: CPT | Mod: 25

## 2022-10-11 PROCEDURE — 80053 COMPREHEN METABOLIC PANEL: CPT | Performed by: PHYSICIAN ASSISTANT

## 2022-10-11 PROCEDURE — 82140 ASSAY OF AMMONIA: CPT | Performed by: PHYSICIAN ASSISTANT

## 2022-10-11 PROCEDURE — 87635 SARS-COV-2 COVID-19 AMP PRB: CPT | Performed by: PHYSICIAN ASSISTANT

## 2022-10-11 PROCEDURE — 85025 COMPLETE CBC W/AUTO DIFF WBC: CPT | Performed by: PHYSICIAN ASSISTANT

## 2022-10-11 PROCEDURE — 96372 THER/PROPH/DIAG INJ SC/IM: CPT | Performed by: PHYSICIAN ASSISTANT

## 2022-10-11 PROCEDURE — 84443 ASSAY THYROID STIM HORMONE: CPT | Performed by: PHYSICIAN ASSISTANT

## 2022-10-11 PROCEDURE — 63600175 PHARM REV CODE 636 W HCPCS: Performed by: PHYSICIAN ASSISTANT

## 2022-10-11 PROCEDURE — 80143 DRUG ASSAY ACETAMINOPHEN: CPT | Performed by: PHYSICIAN ASSISTANT

## 2022-10-11 PROCEDURE — 82077 ASSAY SPEC XCP UR&BREATH IA: CPT | Performed by: PHYSICIAN ASSISTANT

## 2022-10-11 PROCEDURE — 80179 DRUG ASSAY SALICYLATE: CPT | Performed by: PHYSICIAN ASSISTANT

## 2022-10-11 RX ORDER — HALOPERIDOL 5 MG/ML
5 INJECTION INTRAMUSCULAR
Status: COMPLETED | OUTPATIENT
Start: 2022-10-11 | End: 2022-10-11

## 2022-10-11 RX ORDER — DIPHENHYDRAMINE HYDROCHLORIDE 50 MG/ML
50 INJECTION INTRAMUSCULAR; INTRAVENOUS
Status: COMPLETED | OUTPATIENT
Start: 2022-10-11 | End: 2022-10-11

## 2022-10-11 RX ORDER — ZIPRASIDONE MESYLATE 20 MG/ML
10 INJECTION, POWDER, LYOPHILIZED, FOR SOLUTION INTRAMUSCULAR
Status: DISCONTINUED | OUTPATIENT
Start: 2022-10-11 | End: 2022-10-11

## 2022-10-11 RX ORDER — OLANZAPINE 10 MG/2ML
10 INJECTION, POWDER, FOR SOLUTION INTRAMUSCULAR
Status: DISCONTINUED | OUTPATIENT
Start: 2022-10-11 | End: 2022-10-11

## 2022-10-11 RX ORDER — LORAZEPAM 2 MG/ML
2 INJECTION INTRAMUSCULAR
Status: COMPLETED | OUTPATIENT
Start: 2022-10-11 | End: 2022-10-11

## 2022-10-11 RX ADMIN — HALOPERIDOL LACTATE 5 MG: 5 INJECTION, SOLUTION INTRAMUSCULAR at 09:10

## 2022-10-11 RX ADMIN — LORAZEPAM 2 MG: 2 INJECTION INTRAMUSCULAR; INTRAVENOUS at 09:10

## 2022-10-11 RX ADMIN — DIPHENHYDRAMINE HYDROCHLORIDE 50 MG: 50 INJECTION, SOLUTION INTRAMUSCULAR; INTRAVENOUS at 09:10

## 2022-10-11 NOTE — TELEPHONE ENCOUNTER
"Patient's wife, Hardy, called in regards to patient "having a mental breakdown." She states that her  is yelling and saying he wants to kill people and has "lost it." She's very concerned and asked for recommendations. This nurse and Dr. Briones spoke to patient wife, Hardy, together and advised her to go to take him to the ER if he is willing. If not, she needs to go to the coroners office and get a personal protective order filled out on behalf of patient so in the case she has to call 911, they will take him to the hospital.   "

## 2022-10-12 VITALS
DIASTOLIC BLOOD PRESSURE: 68 MMHG | HEIGHT: 67 IN | BODY MASS INDEX: 25.9 KG/M2 | TEMPERATURE: 98 F | RESPIRATION RATE: 18 BRPM | HEART RATE: 81 BPM | OXYGEN SATURATION: 97 % | WEIGHT: 165 LBS | SYSTOLIC BLOOD PRESSURE: 110 MMHG

## 2022-10-12 LAB
AMPHET+METHAMPHET UR QL: NEGATIVE
APAP SERPL-MCNC: <3 UG/ML (ref 10–20)
BARBITURATES UR QL SCN>200 NG/ML: NEGATIVE
BENZODIAZ UR QL SCN>200 NG/ML: NEGATIVE
BILIRUB UR QL STRIP: NEGATIVE
BZE UR QL SCN: ABNORMAL
CANNABINOIDS UR QL SCN: NEGATIVE
CLARITY UR: CLEAR
COLOR UR: YELLOW
CREAT UR-MCNC: 123.4 MG/DL (ref 23–375)
GLUCOSE UR QL STRIP: NEGATIVE
HGB UR QL STRIP: NEGATIVE
KETONES UR QL STRIP: NEGATIVE
LEUKOCYTE ESTERASE UR QL STRIP: NEGATIVE
METHADONE UR QL SCN>300 NG/ML: NEGATIVE
NITRITE UR QL STRIP: NEGATIVE
OPIATES UR QL SCN: NEGATIVE
PCP UR QL SCN>25 NG/ML: NEGATIVE
PH UR STRIP: 5 [PH] (ref 5–8)
PROT UR QL STRIP: NEGATIVE
SALICYLATES SERPL-MCNC: <5 MG/DL (ref 15–30)
SP GR UR STRIP: 1.02 (ref 1–1.03)
TOXICOLOGY INFORMATION: ABNORMAL
URN SPEC COLLECT METH UR: NORMAL
UROBILINOGEN UR STRIP-ACNC: 1 EU/DL

## 2022-10-12 PROCEDURE — 81003 URINALYSIS AUTO W/O SCOPE: CPT | Mod: 59 | Performed by: PHYSICIAN ASSISTANT

## 2022-10-12 PROCEDURE — 80307 DRUG TEST PRSMV CHEM ANLYZR: CPT | Performed by: PHYSICIAN ASSISTANT

## 2022-10-12 RX ORDER — HALOPERIDOL 5 MG/ML
5 INJECTION INTRAMUSCULAR ONCE AS NEEDED
Status: DISCONTINUED | OUTPATIENT
Start: 2022-10-12 | End: 2022-10-12 | Stop reason: HOSPADM

## 2022-10-12 NOTE — ED NOTES
No restraints applied to patient at this time, awaiting urine specimen - urinal at the bedside. Sister and sitter present

## 2022-10-12 NOTE — ED NOTES
Belongings:  Black shoes, black socks, , white tshirt, plaid shirt, green pants given to security    Phone keys and wallet given to patients sister and she placed them in her car

## 2022-10-12 NOTE — ED NOTES
Call placed to wife, Hardy, [phone], no answer but left message with accepting facility name and phone number to deliver personal belongings

## 2022-10-12 NOTE — ED TRIAGE NOTES
Pt brought to ER by sister. She reports increasingly paranoid and angry behavior and is worried patient will harm someone. Pt agitated, not redirectable, unable to answer questions appropriately. Hx of Gerd, episode of angry behavior in 2015

## 2022-10-12 NOTE — ED NOTES
Pt given IM haldol, benadryl and ativan per order. Pt agitated but allowed IM injection. All belongings removed from room and given to security. Patients sister at the bedside and instructed that she cannot have belongings in the room. Robles goyal at bedside

## 2022-10-12 NOTE — ED PROVIDER NOTES
"Encounter Date: 10/11/2022       History     Chief Complaint   Patient presents with    Psychiatric Evaluation     Pt is having thoughts of " wanting to harm other people "due to his " first me " is no longer able to stay calm and cooperative, pt is claiming his " second person is starting to control myself ", pt advised he is afraid of himself     Afebrile 57-year-old male with past medical history of GERD, hypertension tobacco use with no previous psychiatric diagnoses or hospitalizations presents the ED for evaluation of increasing paranoid and aggressive thoughts.  This patient is accompanied by a sister to the ER.  She states since 2015 he has had increasing episodes of agitation anger however usually redirectable.  She states years following this father left and this too seemed to exacerbate these episodes.  She states tonight she was called by wife as he was yelling and screaming.  Family wishes to obtain help.  Sister does not feel threatened however appears he has had thoughts of wanting to harm other people and has paranoid thoughts and delusions related to to selves.  History is limited given patient's lack of  cooperation.    The history is provided by the patient and a relative.   Review of patient's allergies indicates:   Allergen Reactions    No known drug allergies      Past Medical History:   Diagnosis Date    GERD (gastroesophageal reflux disease)     Hx of unilateral nephrectomy     s/p Left Nephrectomy 12/2010/Benign cyst/inflammation    Hypertension     Tobacco abuse      Past Surgical History:   Procedure Laterality Date    KIDNEY SURGERY  2012    kidney removed     Family History   Problem Relation Age of Onset    Hypertension Mother     Diabetes Mother     Cancer Mother         Unknown Cancer type    Hypertension Father      Social History     Tobacco Use    Smoking status: Every Day     Packs/day: 0.50     Years: 38.00     Pack years: 19.00     Types: Cigarettes    Smokeless tobacco: Never "    Tobacco comments:     Considering such   Substance Use Topics    Alcohol use: Yes     Alcohol/week: 13.0 standard drinks     Types: 7 Glasses of wine, 6 Cans of beer per week     Comment: Drinks 6 oz [employer] 3x/week     Review of Systems   Unable to perform ROS: Psychiatric disorder     Physical Exam     Initial Vitals [10/11/22 1947]   BP Pulse Resp Temp SpO2   (!) 158/86 (!) 115 16 97.9 °F (36.6 °C) 98 %      MAP       --         Physical Exam    Constitutional: Vital signs are normal. He appears well-developed and well-nourished. He is cooperative.  Non-toxic appearance. He does not appear ill. He appears distressed.   HENT:   Head: Normocephalic and atraumatic.   Eyes: Conjunctivae and lids are normal.   Neck: Trachea normal. Neck supple. No stridor present.   Normal range of motion.  Cardiovascular:  Normal rate.           Pulmonary/Chest: No respiratory distress.   Abdominal: Abdomen is soft.   Musculoskeletal:      Cervical back: Normal range of motion and neck supple.     Neurological: He is alert and oriented to person, place, and time. GCS eye subscore is 4. GCS verbal subscore is 5. GCS motor subscore is 6.   Skin: Skin is warm, dry and intact. No rash noted.   Psychiatric: His affect is angry and inappropriate. His speech is rapid and/or pressured. He is agitated and aggressive. Thought content is paranoid. He expresses impulsivity.       ED Course   Procedures  Labs Reviewed   CBC W/ AUTO DIFFERENTIAL - Abnormal; Notable for the following components:       Result Value    MCH 31.2 (*)     Immature Granulocytes 0.6 (*)     All other components within normal limits   COMPREHENSIVE METABOLIC PANEL - Abnormal; Notable for the following components:    CO2 22 (*)     Total Bilirubin 1.2 (*)     eGFR 58 (*)     All other components within normal limits   ACETAMINOPHEN LEVEL - Abnormal; Notable for the following components:    Acetaminophen (Tylenol), Serum <3.0 (*)     All other components within normal  limits   AMMONIA - Abnormal; Notable for the following components:    Ammonia 58 (*)     All other components within normal limits   SALICYLATE LEVEL - Abnormal; Notable for the following components:    Salicylate Lvl <5.0 (*)     All other components within normal limits   TSH   ALCOHOL,MEDICAL (ETHANOL)   SALICYLATE LEVEL   URINALYSIS, REFLEX TO URINE CULTURE   DRUG SCREEN PANEL, URINE EMERGENCY   SARS-COV-2 RDRP GENE          Imaging Results              CT Head Without Contrast (Final result)  Result time 10/12/22 01:06:44      Final result by Matthew Palomino MD (10/12/22 01:06:44)                   Impression:      There is no evidence for acute intracranial process.      Electronically signed by: Matthew Palomino  Date:    10/12/2022  Time:    01:06               Narrative:    EXAMINATION:  CT HEAD WITHOUT CONTRAST    CLINICAL HISTORY:  Mental status change, unknown cause;    TECHNIQUE:  Low dose axial images were obtained through the head.  Coronal and sagittal reformations were also performed. Contrast was not administered.    COMPARISON:  None.    FINDINGS:  The ventricular system, sulcal pattern and parenchymal attenuation characteristics appear appropriate for age.  There is no evidence for intracranial mass, mass effect or midline shift, there is no evidence for acute intracranial hemorrhage.  Appropriate CSF spaces are seen at the skull base.    The visualized orbits appear intact.  The osseous structures appear intact.  Mastoid air cells appear well aerated.  Paranasal sinuses appear well aerated.                                       Medications   haloperidol lactate injection 5 mg (has no administration in time range)   diphenhydrAMINE injection 50 mg (50 mg Intramuscular Given 10/11/22 2123)   haloperidol lactate injection 5 mg (5 mg Intramuscular Given 10/11/22 2123)   lorazepam injection 2 mg (2 mg Intramuscular Given 10/11/22 2123)     Medical Decision Making:   History:   Old Medical Records: I  decided to obtain old medical records.  Old Records Summarized: records from clinic visits.  Initial Assessment:   57-year-old with no previous significant psychiatric history presents the ED for increasing agitation, paranoid thoughts.  He appears quite aggressive and agitated.  Standing in the room.  Has flight of ideas with no redirection.  Differential Diagnosis:   Differential Diagnosis includes, but is not limited to:  Decompensated psychiatric disease (schizophrenia, bipolar disorder, major depression), excited delirium, medication noncompliance, substance abuse/withdrawal, intentional drug overdose, medication toxicity, APAP/ASA overdose, acute stress reaction, personality disorder, malingering, metabolic derangement     Clinical Tests:   Lab Tests: Reviewed and Ordered  ED Management:  After complete evaluation, including thorough history and physical exam, the patient's symptoms are most likely due to psychiatric illness. There are no features of history or physical exam indicative of acute medical illness. Vital signs have been stable throughout ED course.  The patient currently denies  SI, although endorsed H thoughts to nursing    Due to patient's presentation, history, and current condition, a PEC was completed for the safety of the patient and medical staff during evaluation and management.  One-to-one observation was initiated.            ED Course as of 10/20/22 1358   Tue Oct 11, 2022   2146 Restraints were initially ordered for patient and staff safety.  Patient was cooperative injections and not reduced strength.  Will cancel [LC]   2336 Ammonia mildly elevated however moderately hemolyzed specimen and hence likely erroneous.  Initial labs unremarkable.  With notified that chemistry machine was down from lab.  Will place VBG to assess CMP.  This will likely delay evaluation of ethanol and acetaminophen levels.  [LC]   Wed Oct 12, 2022   0000 Was notified than labs would be resulted.  Chemistry  grossly unremarkable.  Ethanol less than 10 [LC]   0138   =====================================  Critical Care:  30 minutes total critical care time was personally spent by me, exclusive of procedures and separately billable time.   Critical care was necessary to treat or prevent imminent or life-threatening deterioration of the following conditions: Acute psychiatric presentation requiring chemical sedation and reassessment    =====================================      I independently reviewed and interpreted CT head which shows no acute intracranial bleeding, mass, skull fracture, or acute intracranial process.    I independently reviewed and interpreted labs which are unremarkable / unrevealing.   Patient medically cleared for psychiatry admission.    [RC]      ED Course User Index  [LC] ORTEGA Jim  [RC] Jonny Cho MD                 Clinical Impression:   Final diagnoses:  [F23] Acute psychosis (Primary)  [R45.1] Agitation      ED Disposition Condition    Transfer to Another Facility Rajendra Cho MD  10/12/22 0718       Jonny Cho MD  10/20/22 0173

## 2022-11-04 ENCOUNTER — OFFICE VISIT (OUTPATIENT)
Dept: INTERNAL MEDICINE | Facility: CLINIC | Age: 57
End: 2022-11-04
Payer: COMMERCIAL

## 2022-11-04 ENCOUNTER — LAB VISIT (OUTPATIENT)
Dept: LAB | Facility: HOSPITAL | Age: 57
End: 2022-11-04
Attending: INTERNAL MEDICINE
Payer: COMMERCIAL

## 2022-11-04 VITALS
OXYGEN SATURATION: 98 % | BODY MASS INDEX: 25.47 KG/M2 | HEART RATE: 95 BPM | SYSTOLIC BLOOD PRESSURE: 110 MMHG | WEIGHT: 162.25 LBS | HEIGHT: 67 IN | DIASTOLIC BLOOD PRESSURE: 62 MMHG

## 2022-11-04 DIAGNOSIS — F14.11 COCAINE ABUSE IN REMISSION: ICD-10-CM

## 2022-11-04 DIAGNOSIS — R73.9 HYPERGLYCEMIA: ICD-10-CM

## 2022-11-04 DIAGNOSIS — Z72.0 TOBACCO ABUSE: ICD-10-CM

## 2022-11-04 DIAGNOSIS — F14.91 COCAINE USE DISORDER IN REMISSION: ICD-10-CM

## 2022-11-04 DIAGNOSIS — Z92.89 HOSPITALIZATION WITHIN LAST 30 DAYS: ICD-10-CM

## 2022-11-04 DIAGNOSIS — Z12.5 PROSTATE CANCER SCREENING: ICD-10-CM

## 2022-11-04 DIAGNOSIS — F39 MOOD DISORDER: ICD-10-CM

## 2022-11-04 DIAGNOSIS — Z92.89 HOSPITALIZATION WITHIN LAST 30 DAYS: Primary | ICD-10-CM

## 2022-11-04 LAB
ALBUMIN SERPL BCP-MCNC: 3.7 G/DL (ref 3.5–5.2)
ALP SERPL-CCNC: 68 U/L (ref 55–135)
ALT SERPL W/O P-5'-P-CCNC: 24 U/L (ref 10–44)
AMMONIA PLAS-SCNC: 19 UMOL/L (ref 10–50)
AMPHET+METHAMPHET UR QL: NEGATIVE
ANION GAP SERPL CALC-SCNC: 9 MMOL/L (ref 8–16)
AST SERPL-CCNC: 21 U/L (ref 10–40)
BARBITURATES UR QL SCN>200 NG/ML: NEGATIVE
BENZODIAZ UR QL SCN>200 NG/ML: NEGATIVE
BILIRUB SERPL-MCNC: 0.5 MG/DL (ref 0.1–1)
BUN SERPL-MCNC: 15 MG/DL (ref 6–20)
BZE UR QL SCN: ABNORMAL
CALCIUM SERPL-MCNC: 9.3 MG/DL (ref 8.7–10.5)
CANNABINOIDS UR QL SCN: NEGATIVE
CHLORIDE SERPL-SCNC: 101 MMOL/L (ref 95–110)
CO2 SERPL-SCNC: 27 MMOL/L (ref 23–29)
COMPLEXED PSA SERPL-MCNC: 0.49 NG/ML (ref 0–4)
CREAT SERPL-MCNC: 1.1 MG/DL (ref 0.5–1.4)
CREAT UR-MCNC: 61 MG/DL (ref 23–375)
EST. GFR  (NO RACE VARIABLE): >60 ML/MIN/1.73 M^2
ESTIMATED AVG GLUCOSE: 117 MG/DL (ref 68–131)
GLUCOSE SERPL-MCNC: 73 MG/DL (ref 70–110)
HBA1C MFR BLD: 5.7 % (ref 4–5.6)
METHADONE UR QL SCN>300 NG/ML: NEGATIVE
OPIATES UR QL SCN: NEGATIVE
PCP UR QL SCN>25 NG/ML: NEGATIVE
POTASSIUM SERPL-SCNC: 4.3 MMOL/L (ref 3.5–5.1)
PROT SERPL-MCNC: 6.9 G/DL (ref 6–8.4)
SODIUM SERPL-SCNC: 137 MMOL/L (ref 136–145)
TOXICOLOGY INFORMATION: ABNORMAL

## 2022-11-04 PROCEDURE — 1159F PR MEDICATION LIST DOCUMENTED IN MEDICAL RECORD: ICD-10-PCS | Mod: CPTII,S$GLB,, | Performed by: INTERNAL MEDICINE

## 2022-11-04 PROCEDURE — 84153 ASSAY OF PSA TOTAL: CPT | Performed by: INTERNAL MEDICINE

## 2022-11-04 PROCEDURE — 99214 OFFICE O/P EST MOD 30 MIN: CPT | Mod: S$GLB,,, | Performed by: INTERNAL MEDICINE

## 2022-11-04 PROCEDURE — 3008F BODY MASS INDEX DOCD: CPT | Mod: CPTII,S$GLB,, | Performed by: INTERNAL MEDICINE

## 2022-11-04 PROCEDURE — 99214 PR OFFICE/OUTPT VISIT, EST, LEVL IV, 30-39 MIN: ICD-10-PCS | Mod: S$GLB,,, | Performed by: INTERNAL MEDICINE

## 2022-11-04 PROCEDURE — 80053 COMPREHEN METABOLIC PANEL: CPT | Performed by: INTERNAL MEDICINE

## 2022-11-04 PROCEDURE — 83036 HEMOGLOBIN GLYCOSYLATED A1C: CPT | Performed by: INTERNAL MEDICINE

## 2022-11-04 PROCEDURE — 3074F SYST BP LT 130 MM HG: CPT | Mod: CPTII,S$GLB,, | Performed by: INTERNAL MEDICINE

## 2022-11-04 PROCEDURE — 82140 ASSAY OF AMMONIA: CPT | Performed by: INTERNAL MEDICINE

## 2022-11-04 PROCEDURE — 36415 COLL VENOUS BLD VENIPUNCTURE: CPT | Performed by: INTERNAL MEDICINE

## 2022-11-04 PROCEDURE — 99999 PR PBB SHADOW E&M-EST. PATIENT-LVL III: ICD-10-PCS | Mod: PBBFAC,,, | Performed by: INTERNAL MEDICINE

## 2022-11-04 PROCEDURE — 3078F PR MOST RECENT DIASTOLIC BLOOD PRESSURE < 80 MM HG: ICD-10-PCS | Mod: CPTII,S$GLB,, | Performed by: INTERNAL MEDICINE

## 2022-11-04 PROCEDURE — 80307 DRUG TEST PRSMV CHEM ANLYZR: CPT | Performed by: INTERNAL MEDICINE

## 2022-11-04 PROCEDURE — 3078F DIAST BP <80 MM HG: CPT | Mod: CPTII,S$GLB,, | Performed by: INTERNAL MEDICINE

## 2022-11-04 PROCEDURE — 3074F PR MOST RECENT SYSTOLIC BLOOD PRESSURE < 130 MM HG: ICD-10-PCS | Mod: CPTII,S$GLB,, | Performed by: INTERNAL MEDICINE

## 2022-11-04 PROCEDURE — 3008F PR BODY MASS INDEX (BMI) DOCUMENTED: ICD-10-PCS | Mod: CPTII,S$GLB,, | Performed by: INTERNAL MEDICINE

## 2022-11-04 PROCEDURE — 1159F MED LIST DOCD IN RCRD: CPT | Mod: CPTII,S$GLB,, | Performed by: INTERNAL MEDICINE

## 2022-11-04 PROCEDURE — 99999 PR PBB SHADOW E&M-EST. PATIENT-LVL III: CPT | Mod: PBBFAC,,, | Performed by: INTERNAL MEDICINE

## 2022-11-04 RX ORDER — OXCARBAZEPINE 600 MG/1
600 TABLET, FILM COATED ORAL 2 TIMES DAILY
COMMUNITY
Start: 2022-10-25

## 2022-11-04 RX ORDER — OLANZAPINE 20 MG/1
20 TABLET ORAL NIGHTLY
COMMUNITY
Start: 2022-10-25

## 2022-11-04 RX ORDER — TRAZODONE HYDROCHLORIDE 150 MG/1
150 TABLET ORAL NIGHTLY
COMMUNITY
Start: 2022-10-25

## 2022-11-04 NOTE — PROGRESS NOTES
Subjective:       Patient ID: Kevin Urbina is a 57 y.o. male.    Chief Complaint:   Hospital Follow Up    HPI: Mr Urbina presents with daughter, Micaela, for ER/Hospital follow up  ER(10/11)-He is s/p ER evaluation for psychosis  and paranoid thoughts and aggressive behavior. Pt was PEC'd and ER work up revealed +cocaine on serum blood tox screen. Rest lab and CT Head unremarkable.  He was admitted to [facility] 10/12/22 and D/C'd 10/25/22  He is taking 3 new medications(1-Trazodone 150mg QHS, 2- Zyprexa 20mg QPM, 3- Trileptal 600mg BID).  He has a follow up with a psychiatrist/?name: 'Will get records from [facility] after MARY signed  Mr Urbina notes that the cocaine daily abuse didn't start till after a work dinner 1 year ago.  He was the recipient of an ugly racist comment from a co-manager.  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    Past Medical, Surgical, Social History: Please see as stated in Epic chart which has been reviewed.    Current Outpatient Medications   Medication Sig Dispense Refill    OLANZapine (ZYPREXA) 20 MG tablet Take 20 mg by mouth every evening.      OXcarbazepine (TRILEPTAL) 600 MG Tab Take 600 mg by mouth 2 (two) times daily.      traZODone (DESYREL) 150 MG tablet Take 150 mg by mouth every evening.      wheat dextrin (BENEFIBER CLEAR SF, DEXTRIN,) 3 gram/3.5 gram PwPk 1 packet daily in 6-8 oz fluid/water daily for Constipation (Patient not taking: Reported on 6/23/2022) 30 packet prn     No current facility-administered medications for this visit.       Review of Systems   Constitutional: Negative.    Respiratory: Negative.     Cardiovascular: Negative.    Gastrointestinal: Negative.    Musculoskeletal: Negative.    Neurological: Negative.    Psychiatric/Behavioral:  The patient is  "nervous/anxious.      Objective:      Lab Results   Component Value Date    WBC 6.51 10/11/2022    HGB 17.8 10/11/2022    HCT 49.9 10/11/2022     10/11/2022    CHOL 167 06/26/2020    TRIG 112 06/26/2020    HDL 48 06/26/2020    ALT 32 10/11/2022    AST 32 10/11/2022     10/11/2022    K 3.9 10/11/2022     10/11/2022    CREATININE 1.4 10/11/2022    BUN 18 10/11/2022    CO2 22 (L) 10/11/2022    TSH 3.155 10/11/2022    PSA 0.33 06/26/2020    INR 1.0 11/18/2010    HGBA1C 5.0 05/26/2016     Physical Exam  Vitals reviewed.   Constitutional:       Appearance: Normal appearance.   HENT:      Head: Normocephalic and atraumatic.   Cardiovascular:      Rate and Rhythm: Normal rate and regular rhythm.      Comments: VHR=96  Pulmonary:      Effort: Pulmonary effort is normal.      Breath sounds: Normal breath sounds.   Abdominal:      General: Bowel sounds are normal.      Palpations: Abdomen is soft. There is no mass.      Tenderness: There is no abdominal tenderness.   Musculoskeletal:      Right lower leg: No edema.      Left lower leg: No edema.   Skin:     General: Skin is warm.      Comments: +Varicosities around ankles>left   Neurological:      General: No focal deficit present.      Mental Status: He is alert.   Psychiatric:         Mood and Affect: Mood normal.         Vital Signs  Pulse: 95  SpO2: 98 %  BP: 110/62  Pain Score: 0-No pain  Height and Weight  Height: 5' 7" (170.2 cm)  Weight: 73.6 kg (162 lb 4.1 oz)  BSA (Calculated - sq m): 1.87 sq meters  BMI (Calculated): 25.4  Weight in (lb) to have BMI = 25: 159.3]    Assessment:       1. Hospitalization within last 30 days    2. Cocaine abuse in remission    3. Tobacco abuse    4. Prostate cancer screening        Plan:     Health Maintenance   Topic Date Due    TETANUS VACCINE  04/08/2025    Lipid Panel  06/26/2025    Hepatitis C Screening  Completed        Kevin was seen today for hospital follow up.    Diagnoses and all orders for this " visit:    Hospitalization within last 30 days: s/p Psychotic Episode with Incomplete Data Base for Psychiatric records  -     Drug screen panel, in-house  -     AMMONIA; Future  -     Comprehensive Metabolic Panel; Future  -     Continue: 1-Trazodone 150mg QHS, 2- Zyprexa 20mg QPM, 3- Trileptal 600mg BID  -     Follow along with [facility] as scheduled    Cocaine abuse in remission/s/p Psychotic Episode         -        Drug screen panel, in-house    Tobacco abuse    Prostate cancer screening  -     PSA, Screening; Future    Health Maintenance        -     return to clinic next available EP appointment    Transitional Care Note    Family and/or Caretaker present at visit?  Yes.  Diagnostic tests reviewed/disposition: No diagnosic tests pending after this hospitalization.  Disease/illness education: Yes  Home health/community services discussion/referrals: Patient does not have home health established from hospital visit.  They do not need home health.  If needed, we will set up home health for the patient.   Establishment or re-establishment of referral orders for community resources: No other necessary community resources.   Discussion with other health care providers: No discussion with other health care providers necessary.

## 2022-11-05 DIAGNOSIS — Z12.11 COLON CANCER SCREENING: Primary | ICD-10-CM

## 2022-11-07 ENCOUNTER — PATIENT MESSAGE (OUTPATIENT)
Dept: INTERNAL MEDICINE | Facility: CLINIC | Age: 57
End: 2022-11-07
Payer: COMMERCIAL

## 2022-11-07 ENCOUNTER — TELEPHONE (OUTPATIENT)
Dept: INTERNAL MEDICINE | Facility: CLINIC | Age: 57
End: 2022-11-07
Payer: COMMERCIAL

## 2022-11-07 NOTE — TELEPHONE ENCOUNTER
Message left for Mr Urbina  ie his test results(11/4)-CMP/PSA/Ammonia all ok , HgbA1C slightly high at 5.7; Drug screen + cocaine.

## 2022-11-11 ENCOUNTER — TELEPHONE (OUTPATIENT)
Dept: INTERNAL MEDICINE | Facility: CLINIC | Age: 57
End: 2022-11-11
Payer: COMMERCIAL

## 2023-01-09 NOTE — PROGRESS NOTES
Subjective:       Patient ID: Kevin Urbina is a 57 y.o. male.    Chief Complaint:   Follow-up and Annual Exam    HPI: Mr Urbina presents for follow up the above  Mood Disorder with Psychosis: Med Tx :1-Trazodone 150mg QHS, 2- Zyprexa 20mg QPM, 3- Trileptal 600mg BID).   He is seeing a Psychiatrist regularly through [facility]  He just returned to work yesterday/situation is awkward  + Cocaine Abuse: Yes-last 1 week ago  +Tobacco: + 1/2 pack daily x 40 years/not ready to quit yet  Past Medical,1/2 pack/day Surgical, Social History: Please see as stated in Epic chart which has been reviewed.    Current Outpatient Medications   Medication Sig Dispense Refill    meloxicam (MOBIC) 15 MG tablet 1 tab daily with food x 2 weeks for shoulder pain; then only take as needed 30 tablet 0    OLANZapine (ZYPREXA) 20 MG tablet Take 20 mg by mouth every evening.      OXcarbazepine (TRILEPTAL) 600 MG Tab Take 600 mg by mouth 2 (two) times daily.      traZODone (DESYREL) 150 MG tablet Take 150 mg by mouth every evening.      wheat dextrin (BENEFIBER CLEAR SF, DEXTRIN,) 3 gram/3.5 gram PwPk 1 packet daily in 6-8 oz fluid/water daily for Constipation (Patient not taking: Reported on 6/23/2022) 30 packet prn     No current facility-administered medications for this visit.       Review of Systems   Constitutional:  Negative for fever.   HENT: Negative.     Respiratory:  Positive for cough. Negative for chest tightness and shortness of breath.    Cardiovascular:  Negative for chest pain, palpitations and leg swelling.   Musculoskeletal:  Positive for arthralgias.        Left shoulder pain x few months   Neurological: Negative.    Psychiatric/Behavioral:  The patient is nervous/anxious.      Objective:      Lab Results   Component Value Date    WBC 6.51 10/11/2022    HGB 17.8 10/11/2022    HCT 49.9 10/11/2022     10/11/2022    CHOL 167 06/26/2020    TRIG 112 06/26/2020    HDL 48 06/26/2020    ALT 24 11/04/2022    AST 21 11/04/2022    NA  "137 11/04/2022    K 4.3 11/04/2022     11/04/2022    CREATININE 1.1 11/04/2022    BUN 15 11/04/2022    CO2 27 11/04/2022    TSH 3.155 10/11/2022    PSA 0.49 11/04/2022    INR 1.0 11/18/2010    HGBA1C 5.7 (H) 11/04/2022     Physical Exam  Vitals reviewed.   Constitutional:       Appearance: Normal appearance.   HENT:      Head: Normocephalic and atraumatic.   Cardiovascular:      Rate and Rhythm: Normal rate and regular rhythm.      Heart sounds: Normal heart sounds.   Pulmonary:      Effort: Pulmonary effort is normal.      Breath sounds: Normal breath sounds.   Abdominal:      General: Bowel sounds are normal.      Palpations: Abdomen is soft. There is no mass.      Tenderness: There is no abdominal tenderness.   Musculoskeletal:         General: Tenderness present.      Right lower leg: No edema.      Left lower leg: No edema.      Comments: +Left shoulder with pain on internal abduction  +Pain left shoulder with "empty can" sign   Skin:     General: Skin is warm and dry.   Neurological:      Mental Status: He is alert.         Vital Signs  Pulse: 85  SpO2: 98 %  BP: 118/74  BP Location: Left arm  Patient Position: Sitting  Pain Score:   8  Pain Loc: Shoulder  Height and Weight  Height: 5' 7" (170.2 cm)  Weight: 77.5 kg (170 lb 13.7 oz)  BSA (Calculated - sq m): 1.91 sq meters  BMI (Calculated): 26.8  Weight in (lb) to have BMI = 25: 159.3    Assessment:       1. Mood disorder    2. Acute pain of left shoulder    3. Bilateral shoulder pain, unspecified chronicity    4. Cough, unspecified type    5. Tobacco abuse    6. Cocaine abuse in remission    7. Pre-diabetes    8. High risk medication use    9. Hyperplastic colonic polyp, unspecified part of colon        Plan:     Health Maintenance   Topic Date Due    LDCT Lung Screen  Never done    TETANUS VACCINE  04/08/2025    Lipid Panel  06/26/2025    Hepatitis C Screening  Completed        Kevin was seen today for follow-up and annual exam.    Diagnoses and all " orders for this visit:    Mood disorder       -     Follow along per Psychiatrist    Acute pain of left shoulder/c/w rotator cuff tendonitis R/O other  -     X-Ray Shoulder 2 or More Views Left; Future  -     Ambulatory referral/consult to Physical/Occupational Therapy; Future  -     meloxicam (MOBIC) 15 MG tablet; 1 tab daily with food x 2 weeks for shoulder pain; then only take as needed    Bilateral shoulder pain, unspecified chronicity  -     X-Ray Shoulder 2 or More views Bilateral; Future  -     meloxicam (MOBIC) 15 MG tablet; 1 tab daily with food x 2 weeks for shoulder pain; then only take as needed      Cough, unspecified type  -     CT Chest Lung Screening Low Dose; Future    Tobacco abuse  -     CT Chest Lung Screening Low Dose; Future  -     Pt encouraged to D/C tobacco    Cocaine abuse         -     Pt recommended to D/C such    Pre-diabetes  -     Hemoglobin A1C; Future    High risk medication use  -     Comprehensive Metabolic Panel; Future  -     CBC Auto Differential; Future    Benign Colon polyp        -     Colonoscopy referral placed    Health Maintenance        -     RTC x 5 months with 1 week prior fasting lab

## 2023-01-10 ENCOUNTER — TELEPHONE (OUTPATIENT)
Dept: INTERNAL MEDICINE | Facility: CLINIC | Age: 58
End: 2023-01-10

## 2023-01-10 ENCOUNTER — OFFICE VISIT (OUTPATIENT)
Dept: INTERNAL MEDICINE | Facility: CLINIC | Age: 58
End: 2023-01-10
Payer: COMMERCIAL

## 2023-01-10 VITALS
DIASTOLIC BLOOD PRESSURE: 74 MMHG | BODY MASS INDEX: 26.82 KG/M2 | HEIGHT: 67 IN | OXYGEN SATURATION: 98 % | SYSTOLIC BLOOD PRESSURE: 118 MMHG | WEIGHT: 170.88 LBS | HEART RATE: 85 BPM

## 2023-01-10 DIAGNOSIS — M25.512 BILATERAL SHOULDER PAIN, UNSPECIFIED CHRONICITY: ICD-10-CM

## 2023-01-10 DIAGNOSIS — Z72.0 TOBACCO ABUSE: ICD-10-CM

## 2023-01-10 DIAGNOSIS — M25.512 ACUTE PAIN OF LEFT SHOULDER: ICD-10-CM

## 2023-01-10 DIAGNOSIS — R73.03 PRE-DIABETES: ICD-10-CM

## 2023-01-10 DIAGNOSIS — M25.511 BILATERAL SHOULDER PAIN, UNSPECIFIED CHRONICITY: ICD-10-CM

## 2023-01-10 DIAGNOSIS — Z79.899 HIGH RISK MEDICATION USE: ICD-10-CM

## 2023-01-10 DIAGNOSIS — F39 MOOD DISORDER: Primary | ICD-10-CM

## 2023-01-10 DIAGNOSIS — R05.9 COUGH, UNSPECIFIED TYPE: ICD-10-CM

## 2023-01-10 DIAGNOSIS — K63.5 BENIGN COLON POLYP: ICD-10-CM

## 2023-01-10 DIAGNOSIS — F14.11 COCAINE ABUSE IN REMISSION: ICD-10-CM

## 2023-01-10 PROBLEM — F14.91 COCAINE USE DISORDER IN REMISSION: Status: RESOLVED | Noted: 2022-11-04 | Resolved: 2023-01-10

## 2023-01-10 PROBLEM — F14.10 COCAINE ABUSE: Status: ACTIVE | Noted: 2022-11-04

## 2023-01-10 PROCEDURE — 1159F MED LIST DOCD IN RCRD: CPT | Mod: CPTII,S$GLB,, | Performed by: INTERNAL MEDICINE

## 2023-01-10 PROCEDURE — 3078F PR MOST RECENT DIASTOLIC BLOOD PRESSURE < 80 MM HG: ICD-10-PCS | Mod: CPTII,S$GLB,, | Performed by: INTERNAL MEDICINE

## 2023-01-10 PROCEDURE — 99396 PREV VISIT EST AGE 40-64: CPT | Mod: S$GLB,,, | Performed by: INTERNAL MEDICINE

## 2023-01-10 PROCEDURE — 3074F SYST BP LT 130 MM HG: CPT | Mod: CPTII,S$GLB,, | Performed by: INTERNAL MEDICINE

## 2023-01-10 PROCEDURE — 99999 PR PBB SHADOW E&M-EST. PATIENT-LVL V: ICD-10-PCS | Mod: PBBFAC,,, | Performed by: INTERNAL MEDICINE

## 2023-01-10 PROCEDURE — 3078F DIAST BP <80 MM HG: CPT | Mod: CPTII,S$GLB,, | Performed by: INTERNAL MEDICINE

## 2023-01-10 PROCEDURE — 3074F PR MOST RECENT SYSTOLIC BLOOD PRESSURE < 130 MM HG: ICD-10-PCS | Mod: CPTII,S$GLB,, | Performed by: INTERNAL MEDICINE

## 2023-01-10 PROCEDURE — 3008F BODY MASS INDEX DOCD: CPT | Mod: CPTII,S$GLB,, | Performed by: INTERNAL MEDICINE

## 2023-01-10 PROCEDURE — 1159F PR MEDICATION LIST DOCUMENTED IN MEDICAL RECORD: ICD-10-PCS | Mod: CPTII,S$GLB,, | Performed by: INTERNAL MEDICINE

## 2023-01-10 PROCEDURE — 3008F PR BODY MASS INDEX (BMI) DOCUMENTED: ICD-10-PCS | Mod: CPTII,S$GLB,, | Performed by: INTERNAL MEDICINE

## 2023-01-10 PROCEDURE — 99999 PR PBB SHADOW E&M-EST. PATIENT-LVL V: CPT | Mod: PBBFAC,,, | Performed by: INTERNAL MEDICINE

## 2023-01-10 PROCEDURE — 99396 PR PREVENTIVE VISIT,EST,40-64: ICD-10-PCS | Mod: S$GLB,,, | Performed by: INTERNAL MEDICINE

## 2023-01-10 RX ORDER — MELOXICAM 15 MG/1
TABLET ORAL
Qty: 30 TABLET | Refills: 0 | Status: SHIPPED | OUTPATIENT
Start: 2023-01-10 | End: 2023-05-01

## 2023-01-10 NOTE — PATIENT INSTRUCTIONS
Reminder: For Pre-Diabetes:  #1-STOP Lenin Aid and Sodas  #2-Start [place] [employer], and Water    Work Dates missed and OKd by Psychiatrist: 12/4/22-1/8/23    For Cough:  CT chest  QUIT SMOKING   and QUIT COCAINE

## 2023-01-11 ENCOUNTER — HOSPITAL ENCOUNTER (OUTPATIENT)
Dept: RADIOLOGY | Facility: HOSPITAL | Age: 58
Discharge: HOME | End: 2023-01-11
Attending: INTERNAL MEDICINE
Payer: COMMERCIAL

## 2023-01-11 NOTE — TELEPHONE ENCOUNTER
Etta, would you please call Endoscopy to check on pt's colonoscopy appt  which was ordered in June.  Thanks

## 2023-05-29 DIAGNOSIS — M25.512 BILATERAL SHOULDER PAIN, UNSPECIFIED CHRONICITY: ICD-10-CM

## 2023-05-29 DIAGNOSIS — M25.512 ACUTE PAIN OF LEFT SHOULDER: ICD-10-CM

## 2023-05-29 DIAGNOSIS — M25.511 BILATERAL SHOULDER PAIN, UNSPECIFIED CHRONICITY: ICD-10-CM

## 2023-05-29 RX ORDER — MELOXICAM 15 MG/1
TABLET ORAL
Qty: 30 TABLET | Refills: 0 | Status: SHIPPED | OUTPATIENT
Start: 2023-05-29 | End: 2023-08-28

## 2023-06-15 ENCOUNTER — PATIENT MESSAGE (OUTPATIENT)
Dept: INTERNAL MEDICINE | Facility: CLINIC | Age: 58
End: 2023-06-15
Payer: MEDICAID

## 2023-06-16 ENCOUNTER — OCCUPATIONAL HEALTH (OUTPATIENT)
Dept: URGENT CARE | Facility: CLINIC | Age: 58
End: 2023-06-16

## 2023-06-16 ENCOUNTER — PATIENT MESSAGE (OUTPATIENT)
Dept: INTERNAL MEDICINE | Facility: CLINIC | Age: 58
End: 2023-06-16
Payer: MEDICAID

## 2023-06-16 DIAGNOSIS — Z13.9 ENCOUNTER FOR SCREENING: Primary | ICD-10-CM

## 2023-06-16 PROCEDURE — 86580 POCT TB SKIN TEST: ICD-10-PCS | Mod: S$GLB,,, | Performed by: PHYSICIAN ASSISTANT

## 2023-06-16 PROCEDURE — 86580 TB INTRADERMAL TEST: CPT | Mod: S$GLB,,, | Performed by: PHYSICIAN ASSISTANT

## 2023-06-19 LAB
TB INDURATION - 48 HR READ: 0 MM
TB INDURATION - 72 HR READ: 0 MM
TB SKIN TEST - 48 HR READ: NEGATIVE
TB SKIN TEST - 72 HR READ: NEGATIVE

## 2023-07-17 ENCOUNTER — PATIENT MESSAGE (OUTPATIENT)
Dept: INTERNAL MEDICINE | Facility: CLINIC | Age: 58
End: 2023-07-17
Payer: MEDICAID

## 2023-08-28 DIAGNOSIS — M25.512 ACUTE PAIN OF LEFT SHOULDER: ICD-10-CM

## 2023-08-28 DIAGNOSIS — M25.511 BILATERAL SHOULDER PAIN, UNSPECIFIED CHRONICITY: ICD-10-CM

## 2023-08-28 DIAGNOSIS — M25.512 BILATERAL SHOULDER PAIN, UNSPECIFIED CHRONICITY: ICD-10-CM

## 2023-08-28 RX ORDER — MELOXICAM 15 MG/1
TABLET ORAL
Qty: 30 TABLET | Refills: 0 | Status: SHIPPED | OUTPATIENT
Start: 2023-08-28 | End: 2023-10-28 | Stop reason: SDUPTHER

## 2023-09-07 ENCOUNTER — OFFICE VISIT (OUTPATIENT)
Dept: INTERNAL MEDICINE | Facility: CLINIC | Age: 58
End: 2023-09-07
Payer: MEDICAID

## 2023-09-07 ENCOUNTER — LAB VISIT (OUTPATIENT)
Dept: LAB | Facility: HOSPITAL | Age: 58
End: 2023-09-07
Attending: INTERNAL MEDICINE
Payer: MEDICAID

## 2023-09-07 VITALS
DIASTOLIC BLOOD PRESSURE: 78 MMHG | SYSTOLIC BLOOD PRESSURE: 120 MMHG | WEIGHT: 165 LBS | HEART RATE: 89 BPM | HEIGHT: 67 IN | BODY MASS INDEX: 25.9 KG/M2 | OXYGEN SATURATION: 98 %

## 2023-09-07 DIAGNOSIS — M25.50 PAIN IN JOINT INVOLVING MULTIPLE SITES: ICD-10-CM

## 2023-09-07 DIAGNOSIS — Z00.00 ANNUAL PHYSICAL EXAM: ICD-10-CM

## 2023-09-07 DIAGNOSIS — Z00.00 ANNUAL PHYSICAL EXAM: Primary | ICD-10-CM

## 2023-09-07 DIAGNOSIS — G89.29 CHRONIC PAIN OF BOTH SHOULDERS: ICD-10-CM

## 2023-09-07 DIAGNOSIS — F39 MOOD DISORDER WITH PSYCHOSIS: ICD-10-CM

## 2023-09-07 DIAGNOSIS — M25.511 CHRONIC PAIN OF BOTH SHOULDERS: ICD-10-CM

## 2023-09-07 DIAGNOSIS — K58.9 IRRITABLE BOWEL SYNDROME, UNSPECIFIED TYPE: ICD-10-CM

## 2023-09-07 DIAGNOSIS — K63.5 HYPERPLASTIC COLONIC POLYP, UNSPECIFIED PART OF COLON: ICD-10-CM

## 2023-09-07 DIAGNOSIS — Z72.0 TOBACCO ABUSE: ICD-10-CM

## 2023-09-07 DIAGNOSIS — M67.471 GANGLION CYST OF RIGHT FOOT: ICD-10-CM

## 2023-09-07 DIAGNOSIS — M25.512 CHRONIC PAIN OF BOTH SHOULDERS: ICD-10-CM

## 2023-09-07 LAB
ALBUMIN SERPL BCP-MCNC: 4.1 G/DL (ref 3.5–5.2)
ALP SERPL-CCNC: 78 U/L (ref 55–135)
ALT SERPL W/O P-5'-P-CCNC: 32 U/L (ref 10–44)
ANION GAP SERPL CALC-SCNC: 12 MMOL/L (ref 8–16)
AST SERPL-CCNC: 27 U/L (ref 10–40)
BASOPHILS # BLD AUTO: 0.02 K/UL (ref 0–0.2)
BASOPHILS NFR BLD: 0.4 % (ref 0–1.9)
BILIRUB SERPL-MCNC: 0.7 MG/DL (ref 0.1–1)
BUN SERPL-MCNC: 24 MG/DL (ref 6–20)
CALCIUM SERPL-MCNC: 9.2 MG/DL (ref 8.7–10.5)
CHLORIDE SERPL-SCNC: 106 MMOL/L (ref 95–110)
CHOLEST SERPL-MCNC: 233 MG/DL (ref 120–199)
CHOLEST/HDLC SERPL: 4.1 {RATIO} (ref 2–5)
CO2 SERPL-SCNC: 25 MMOL/L (ref 23–29)
COMPLEXED PSA SERPL-MCNC: 0.71 NG/ML (ref 0–4)
CREAT SERPL-MCNC: 1.3 MG/DL (ref 0.5–1.4)
CRP SERPL-MCNC: 4 MG/L (ref 0–8.2)
DIFFERENTIAL METHOD: ABNORMAL
EOSINOPHIL # BLD AUTO: 0.1 K/UL (ref 0–0.5)
EOSINOPHIL NFR BLD: 1.7 % (ref 0–8)
ERYTHROCYTE [DISTWIDTH] IN BLOOD BY AUTOMATED COUNT: 12.3 % (ref 11.5–14.5)
ERYTHROCYTE [SEDIMENTATION RATE] IN BLOOD BY PHOTOMETRIC METHOD: <2 MM/HR (ref 0–23)
EST. GFR  (NO RACE VARIABLE): >60 ML/MIN/1.73 M^2
GLUCOSE SERPL-MCNC: 93 MG/DL (ref 70–110)
HCT VFR BLD AUTO: 46.2 % (ref 40–54)
HDLC SERPL-MCNC: 57 MG/DL (ref 40–75)
HDLC SERPL: 24.5 % (ref 20–50)
HGB BLD-MCNC: 15.1 G/DL (ref 14–18)
HIV 1+2 AB+HIV1 P24 AG SERPL QL IA: NORMAL
IMM GRANULOCYTES # BLD AUTO: 0.05 K/UL (ref 0–0.04)
IMM GRANULOCYTES NFR BLD AUTO: 1 % (ref 0–0.5)
LDLC SERPL CALC-MCNC: 140 MG/DL (ref 63–159)
LYMPHOCYTES # BLD AUTO: 1.1 K/UL (ref 1–4.8)
LYMPHOCYTES NFR BLD: 20.7 % (ref 18–48)
MCH RBC QN AUTO: 29 PG (ref 27–31)
MCHC RBC AUTO-ENTMCNC: 32.7 G/DL (ref 32–36)
MCV RBC AUTO: 89 FL (ref 82–98)
MONOCYTES # BLD AUTO: 0.6 K/UL (ref 0.3–1)
MONOCYTES NFR BLD: 10.9 % (ref 4–15)
NEUTROPHILS # BLD AUTO: 3.4 K/UL (ref 1.8–7.7)
NEUTROPHILS NFR BLD: 65.3 % (ref 38–73)
NONHDLC SERPL-MCNC: 176 MG/DL
NRBC BLD-RTO: 0 /100 WBC
PLATELET # BLD AUTO: 209 K/UL (ref 150–450)
PMV BLD AUTO: 11.2 FL (ref 9.2–12.9)
POTASSIUM SERPL-SCNC: 4 MMOL/L (ref 3.5–5.1)
PROT SERPL-MCNC: 7.2 G/DL (ref 6–8.4)
RBC # BLD AUTO: 5.2 M/UL (ref 4.6–6.2)
SODIUM SERPL-SCNC: 143 MMOL/L (ref 136–145)
TRIGL SERPL-MCNC: 180 MG/DL (ref 30–150)
TSH SERPL DL<=0.005 MIU/L-ACNC: 2.44 UIU/ML (ref 0.4–4)
WBC # BLD AUTO: 5.23 K/UL (ref 3.9–12.7)

## 2023-09-07 PROCEDURE — 87389 HIV-1 AG W/HIV-1&-2 AB AG IA: CPT | Performed by: INTERNAL MEDICINE

## 2023-09-07 PROCEDURE — 85652 RBC SED RATE AUTOMATED: CPT | Performed by: INTERNAL MEDICINE

## 2023-09-07 PROCEDURE — 85025 COMPLETE CBC W/AUTO DIFF WBC: CPT | Performed by: INTERNAL MEDICINE

## 2023-09-07 PROCEDURE — 86140 C-REACTIVE PROTEIN: CPT | Performed by: INTERNAL MEDICINE

## 2023-09-07 PROCEDURE — 3078F DIAST BP <80 MM HG: CPT | Mod: CPTII,,, | Performed by: INTERNAL MEDICINE

## 2023-09-07 PROCEDURE — 80053 COMPREHEN METABOLIC PANEL: CPT | Performed by: INTERNAL MEDICINE

## 2023-09-07 PROCEDURE — 3008F PR BODY MASS INDEX (BMI) DOCUMENTED: ICD-10-PCS | Mod: CPTII,,, | Performed by: INTERNAL MEDICINE

## 2023-09-07 PROCEDURE — 1159F MED LIST DOCD IN RCRD: CPT | Mod: CPTII,,, | Performed by: INTERNAL MEDICINE

## 2023-09-07 PROCEDURE — 99999 PR PBB SHADOW E&M-EST. PATIENT-LVL V: ICD-10-PCS | Mod: PBBFAC,,, | Performed by: INTERNAL MEDICINE

## 2023-09-07 PROCEDURE — 99396 PR PREVENTIVE VISIT,EST,40-64: ICD-10-PCS | Mod: S$PBB,,, | Performed by: INTERNAL MEDICINE

## 2023-09-07 PROCEDURE — 1159F PR MEDICATION LIST DOCUMENTED IN MEDICAL RECORD: ICD-10-PCS | Mod: CPTII,,, | Performed by: INTERNAL MEDICINE

## 2023-09-07 PROCEDURE — 3074F SYST BP LT 130 MM HG: CPT | Mod: CPTII,,, | Performed by: INTERNAL MEDICINE

## 2023-09-07 PROCEDURE — 84153 ASSAY OF PSA TOTAL: CPT | Performed by: INTERNAL MEDICINE

## 2023-09-07 PROCEDURE — 3078F PR MOST RECENT DIASTOLIC BLOOD PRESSURE < 80 MM HG: ICD-10-PCS | Mod: CPTII,,, | Performed by: INTERNAL MEDICINE

## 2023-09-07 PROCEDURE — 99215 OFFICE O/P EST HI 40 MIN: CPT | Mod: PBBFAC | Performed by: INTERNAL MEDICINE

## 2023-09-07 PROCEDURE — 3008F BODY MASS INDEX DOCD: CPT | Mod: CPTII,,, | Performed by: INTERNAL MEDICINE

## 2023-09-07 PROCEDURE — 80061 LIPID PANEL: CPT | Performed by: INTERNAL MEDICINE

## 2023-09-07 PROCEDURE — 99999 PR PBB SHADOW E&M-EST. PATIENT-LVL V: CPT | Mod: PBBFAC,,, | Performed by: INTERNAL MEDICINE

## 2023-09-07 PROCEDURE — 99396 PREV VISIT EST AGE 40-64: CPT | Mod: S$PBB,,, | Performed by: INTERNAL MEDICINE

## 2023-09-07 PROCEDURE — 3074F PR MOST RECENT SYSTOLIC BLOOD PRESSURE < 130 MM HG: ICD-10-PCS | Mod: CPTII,,, | Performed by: INTERNAL MEDICINE

## 2023-09-07 PROCEDURE — 84443 ASSAY THYROID STIM HORMONE: CPT | Performed by: INTERNAL MEDICINE

## 2023-09-07 PROCEDURE — 36415 COLL VENOUS BLD VENIPUNCTURE: CPT | Performed by: INTERNAL MEDICINE

## 2023-09-07 RX ORDER — SERTRALINE HYDROCHLORIDE 25 MG/1
25 TABLET, FILM COATED ORAL
COMMUNITY
Start: 2023-08-01

## 2023-09-07 RX ORDER — QUETIAPINE FUMARATE 50 MG/1
50 TABLET, FILM COATED ORAL NIGHTLY
COMMUNITY
Start: 2023-08-01

## 2023-09-07 NOTE — PROGRESS NOTES
Subjective:       Patient ID: Kevin Urbina is a 58 y.o. male.    Chief Complaint:   Follow-up, Mood Disorder, and Joint Pain    HPI: Mr Urbina presents for follow up the above    C/O Joint Pain: he c/o bilateral shoulder pain and decreased ROM of shoulders.  The shoulder pain is improved with Mobic which he takes daily  He doesn't sleep well  but ran out of psychiatric meds x 2 months and now back on  x last month  C/O Abdominal Pain: + Off/on,  + crampy in quality, No present problems with constipation/diarrhea, No BPR  Mood Disorder with Psychosis: Med Tx :1-Zoloft 25mg QD, 2- ?Sleep Med/Waiting on getting at pharmacy 3- Trileptal 600mg BID. He is seeing a Psychiatrist regularly through [facility]  +Tobacco: + 1/2 pack daily x 40 years  + Hx Cocaine Abuse  Past Medical, Surgical, Social History: Please see as stated in Epic chart which has been reviewed.    Current Outpatient Medications   Medication Sig Dispense Refill    OXcarbazepine (TRILEPTAL) 600 MG Tab Take 600 mg by mouth 2 (two) times daily.      QUEtiapine (SEROQUEL) 50 MG tablet Take 50 mg by mouth every evening.      sertraline (ZOLOFT) 25 MG tablet Take 25 mg by mouth.      meloxicam (MOBIC) 15 MG tablet TAKE 1 TABLET BY MOUTH DAILY WITH FOOD FOR TWO WEEKS FOR SHOULDER PAIN. THEN ONLY TAKE AS NEEDED 30 tablet 0    OLANZapine (ZYPREXA) 20 MG tablet Take 20 mg by mouth every evening.      traZODone (DESYREL) 150 MG tablet Take 150 mg by mouth every evening.      wheat dextrin (BENEFIBER CLEAR SF, DEXTRIN,) 3 gram/3.5 gram PwPk 1 packet daily in 6-8 oz fluid/water daily for Constipation (Patient not taking: Reported on 6/23/2022) 30 packet prn     No current facility-administered medications for this visit.       Review of Systems   Constitutional:  Positive for activity change. Negative for unexpected weight change.   HENT:  Negative for hearing loss, rhinorrhea and trouble swallowing.    Eyes:  Positive for visual disturbance. Negative for  discharge.   Respiratory:  Negative for chest tightness and wheezing.    Cardiovascular:  Negative for chest pain and palpitations.   Gastrointestinal:  Positive for abdominal pain. Negative for blood in stool, constipation, diarrhea and vomiting.        + Abdominal pain off/on  + Some rectal muscle weakness: Has a BM when he goes to urinate    Endocrine: Negative for polydipsia and polyuria.   Genitourinary: Negative.  Negative for difficulty urinating, hematuria and urgency.   Musculoskeletal:  Positive for arthralgias, joint swelling and myalgias. Negative for neck pain.        +Joint pain/See HPI   Neurological:  Positive for weakness. Negative for headaches.   Psychiatric/Behavioral:  Positive for dysphoric mood. Negative for confusion.        Objective:      Lab Results   Component Value Date    WBC 5.23 09/07/2023    HGB 15.1 09/07/2023    HCT 46.2 09/07/2023     09/07/2023    CHOL 233 (H) 09/07/2023    TRIG 180 (H) 09/07/2023    HDL 57 09/07/2023    ALT 32 09/07/2023    AST 27 09/07/2023     09/07/2023    K 4.0 09/07/2023     09/07/2023    CREATININE 1.3 09/07/2023    BUN 24 (H) 09/07/2023    CO2 25 09/07/2023    TSH 2.437 09/07/2023    PSA 0.71 09/07/2023    INR 1.0 11/18/2010    HGBA1C 5.7 (H) 11/04/2022     Physical Exam  Vitals reviewed.   Constitutional:       Appearance: Normal appearance.   Cardiovascular:      Rate and Rhythm: Normal rate and regular rhythm.   Pulmonary:      Breath sounds: Normal breath sounds.   Abdominal:      General: Bowel sounds are normal.      Palpations: Abdomen is soft. There is no mass.      Tenderness: There is no abdominal tenderness.   Musculoskeletal:         General: Tenderness and deformity present.      Right lower leg: No edema.      Left lower leg: No edema.      Comments: Bilateral shoulders show decreased ROM  with pain on internal abduction  +Crepitance right shoulder with ROM  +Knee OA type changes>right  Right foot + 2-3 cm in diameter soft  "subcutaneous reducible mass c/w "ganglion cyst"   Neurological:      Mental Status: He is alert.   Psychiatric:         Mood and Affect: Mood normal.           Vital Signs  Pulse: 89  SpO2: 98 %  BP: 120/78  Height and Weight  Height: 5' 7" (170.2 cm)  Weight: 74.8 kg (165 lb)  BSA (Calculated - sq m): 1.88 sq meters  BMI (Calculated): 25.8  Weight in (lb) to have BMI = 25: 159.3    Assessment:       1. Annual physical exam    2. Mood disorder with psychosis    3. Chronic pain of both shoulders    4. Pain in joint involving multiple sites    5. Ganglion cyst of right foot    6. Tobacco abuse    7. Hyperplastic colonic polyp, unspecified part of colon    8. Injury of left lower extremity, subsequent encounter    9. Irritable bowel syndrome, unspecified type        Plan:     Health Maintenance   Topic Date Due    LDCT Lung Screen  Never done    Shingles Vaccine (1 of 2) Never done    Colorectal Cancer Screening  03/25/2019    TETANUS VACCINE  04/08/2025    Lipid Panel  09/07/2028    Hepatitis C Screening  Completed        Kevin was seen today for follow-up, mood disorder and joint pain.    Diagnoses and all orders for this visit:    Annual physical exam  -     CBC Auto Differential; Future  -     Comprehensive Metabolic Panel; Future  -     TSH; Future  -     PSA, Screening; Future  -     Lipid Panel; Future  -     Sedimentation rate; Future  -     C-Reactive Protein; Future  -     HIV 1/2 Ag/Ab (4th Gen); Future    Mood disorder with psychosis        -     follow along as per Psychiatrist/[facility]    Chronic pain of both shoulders  -     X-Ray Shoulder 2 or More Views Left; Future  -     X-ray Shoulder 2 or More Views Right; Future  -     Ambulatory referral/consult to Orthopedics; Future  -     Ambulatory referral/consult to Physical/Occupational Therapy; Future    Pain in joint involving multiple sites  -     X-ray Arthritis Survey; Future    Ganglion cyst of right foot        -     Reassurance/Happy to " refer to Orthopedics prn    Tobacco abuse    Irritable bowel syndrome, unspecified type/Rule Out other         -     patient recommended to start daily Benefiber at 1 packet in bottle    Water/liquid daily           -     refer for colonoscopy as is past due        -     will order abdominal pelvic ultrasound if no improvement in symptoms    Hyperplastic colonic polyp, unspecified part of colon/status post colonoscopy March 2014 so confused  -     Case Request Endoscopy: COLONOSCOPY      Health maintenance        -     return to clinic times 4-5 months follow-up or see patient sooner if needed

## 2023-09-07 NOTE — PATIENT INSTRUCTIONS
For Bowels:  #1- Start Benefiber at 1 packet daily in a bottle water or liquid daily are chest crazy really

## 2023-10-28 DIAGNOSIS — M25.511 BILATERAL SHOULDER PAIN, UNSPECIFIED CHRONICITY: ICD-10-CM

## 2023-10-28 DIAGNOSIS — M25.512 BILATERAL SHOULDER PAIN, UNSPECIFIED CHRONICITY: ICD-10-CM

## 2023-10-28 DIAGNOSIS — M25.512 ACUTE PAIN OF LEFT SHOULDER: ICD-10-CM

## 2023-10-28 NOTE — TELEPHONE ENCOUNTER
No care due was identified.  [facility] Embedded Care Due Messages. Reference number: [id number].   10/28/2023 10:41:05 AM CDT

## 2023-10-30 RX ORDER — MELOXICAM 15 MG/1
TABLET ORAL
Qty: 30 TABLET | Refills: 0 | Status: SHIPPED | OUTPATIENT
Start: 2023-10-30

## 2023-11-22 ENCOUNTER — OFFICE VISIT (OUTPATIENT)
Dept: URGENT CARE | Facility: CLINIC | Age: 58
End: 2023-11-22
Payer: MEDICAID

## 2023-11-22 VITALS
TEMPERATURE: 98 F | BODY MASS INDEX: 25.6 KG/M2 | DIASTOLIC BLOOD PRESSURE: 84 MMHG | HEIGHT: 67 IN | OXYGEN SATURATION: 95 % | RESPIRATION RATE: 18 BRPM | SYSTOLIC BLOOD PRESSURE: 138 MMHG | HEART RATE: 75 BPM | WEIGHT: 163.13 LBS

## 2023-11-22 DIAGNOSIS — J10.1 INFLUENZA A: Primary | ICD-10-CM

## 2023-11-22 LAB
CTP QC/QA: YES
CTP QC/QA: YES
POC MOLECULAR INFLUENZA A AGN: POSITIVE
POC MOLECULAR INFLUENZA B AGN: NEGATIVE
SARS-COV-2 AG RESP QL IA.RAPID: NEGATIVE

## 2023-11-22 PROCEDURE — 99214 OFFICE O/P EST MOD 30 MIN: CPT | Mod: S$GLB,,, | Performed by: NURSE PRACTITIONER

## 2023-11-22 PROCEDURE — 87811 SARS CORONAVIRUS 2 ANTIGEN POCT, MANUAL READ: ICD-10-PCS | Mod: QW,S$GLB,, | Performed by: NURSE PRACTITIONER

## 2023-11-22 PROCEDURE — 99214 PR OFFICE/OUTPT VISIT, EST, LEVL IV, 30-39 MIN: ICD-10-PCS | Mod: S$GLB,,, | Performed by: NURSE PRACTITIONER

## 2023-11-22 PROCEDURE — 87502 INFLUENZA DNA AMP PROBE: CPT | Mod: QW,S$GLB,, | Performed by: NURSE PRACTITIONER

## 2023-11-22 PROCEDURE — 87502 POCT INFLUENZA A/B MOLECULAR: ICD-10-PCS | Mod: QW,S$GLB,, | Performed by: NURSE PRACTITIONER

## 2023-11-22 PROCEDURE — 87811 SARS-COV-2 COVID19 W/OPTIC: CPT | Mod: QW,S$GLB,, | Performed by: NURSE PRACTITIONER

## 2023-11-22 RX ORDER — OSELTAMIVIR PHOSPHATE 75 MG/1
75 CAPSULE ORAL 2 TIMES DAILY
Qty: 10 CAPSULE | Refills: 0 | Status: SHIPPED | OUTPATIENT
Start: 2023-11-22 | End: 2023-11-27

## 2023-11-22 NOTE — PROGRESS NOTES
"Subjective:      Patient ID: Kevin Urbina is a 58 y.o. male.    Vitals:  height is 5' 7" (1.702 m) and weight is 74 kg (163 lb 2.3 oz). His oral temperature is 98.1 °F (36.7 °C). His blood pressure is 138/84 and his pulse is 75. His respiration is 18 and oxygen saturation is 95%.     Chief Complaint: Cough    This is a 58 y.o. male who presents today with a chief complaint of body aches, cough, diarrhea, headaches. Sx started on Monday, . Treatment include Theraflu, tylenol with mild relief.  denies fever,  or chills, denies  wheezing or shortness of breath, denies nausea, vomiting, or abdominal pain, denies chest pain or dizziness positional lightheadedness, denies sore throat or trouble swallowing, denies loss of taste or smell, or any other symptoms        Cough  This is a new problem. The current episode started in the past 7 days. The problem has been gradually worsening. The problem occurs constantly. The cough is Productive of sputum. Associated symptoms include headaches and myalgias. Pertinent negatives include no nasal congestion, postnasal drip, sore throat or wheezing. Nothing aggravates the symptoms. He has tried OTC cough suppressant for the symptoms.       HENT:  Negative for postnasal drip and sore throat.    Respiratory:  Positive for cough. Negative for wheezing.    Gastrointestinal:  Positive for diarrhea.   Musculoskeletal:  Positive for muscle ache.   Neurological:  Positive for headaches.      Objective:     Physical Exam   Constitutional: He is oriented to person, place, and time. He appears well-developed. He is cooperative.  Non-toxic appearance. He does not appear ill. No distress.   HENT:   Head: Normocephalic and atraumatic.   Ears:   Right Ear: Hearing, tympanic membrane, external ear and ear canal normal.   Left Ear: Hearing, tympanic membrane, external ear and ear canal normal.   Nose: Nose normal. No mucosal edema, rhinorrhea or nasal deformity. No epistaxis. Right sinus exhibits " no maxillary sinus tenderness and no frontal sinus tenderness. Left sinus exhibits no maxillary sinus tenderness and no frontal sinus tenderness.   Mouth/Throat: Uvula is midline, oropharynx is clear and moist and mucous membranes are normal. No trismus in the jaw. Normal dentition. No uvula swelling. No oropharyngeal exudate, posterior oropharyngeal edema or posterior oropharyngeal erythema.   Eyes: Conjunctivae and lids are normal. No scleral icterus.   Neck: Trachea normal and phonation normal. Neck supple. No edema present. No erythema present. No neck rigidity present.   Cardiovascular: Normal rate, regular rhythm, normal heart sounds and normal pulses.   Pulmonary/Chest: Effort normal and breath sounds normal. No stridor. No respiratory distress. He has no decreased breath sounds. He has no wheezes. He has no rhonchi. He has no rales.   Abdominal: Normal appearance.   Musculoskeletal: Normal range of motion.         General: No deformity. Normal range of motion.   Neurological: He is alert and oriented to person, place, and time. He exhibits normal muscle tone. Coordination normal.   Skin: Skin is warm, dry, intact, not diaphoretic and not pale.   Psychiatric: His speech is normal and behavior is normal. Judgment and thought content normal.   Nursing note and vitals reviewed.    Results for orders placed or performed in visit on 11/22/23   SARS Coronavirus 2 Antigen, POCT Manual Read   Result Value Ref Range    SARS Coronavirus 2 Antigen Negative Negative     Acceptable Yes    POCT Influenza A/B MOLECULAR   Result Value Ref Range    POC Molecular Influenza A Ag Positive (A) Negative, Not Reported    POC Molecular Influenza B Ag Negative Negative, Not Reported     Acceptable Yes          Patient in no acute distress.  Vitals reassuring.  Discussed results/diagnosis/plan in depth with patient in clinic. Strict precautions given to patient to monitor for worsening signs and symptoms.  Advised to follow up with primary.All questions answered. Strict ER precautions given. If your symptoms worsens or fail to improve you should go to the Emergency Room. Discharge and follow-up instructions given verbally/printed. Discharge and follow-up instructions discussed with the patient who expressed understanding and willingness to comply with my recommendations.Patient voiced understanding and in agreement with current treatment plan.     Please be advised this text was dictated with [employer] software and may contain errors due to translation.    Assessment:     1. Influenza A        Plan:       Influenza A  -     SARS Coronavirus 2 Antigen, POCT Manual Read  -     POCT Influenza A/B MOLECULAR  -     oseltamivir (TAMIFLU) 75 MG capsule; Take 1 capsule (75 mg total) by mouth 2 (two) times daily. for 5 days  Dispense: 10 capsule; Refill: 0                  Patient Instructions   PLEASE READ YOUR DISCHARGE INSTRUCTIONS ENTIRELY AS IT CONTAINS IMPORTANT INFORMATION.      You have been diagnosed with Influenza.     You are contagious for 24 hours after you start the Tamilfu or 24 hours after your last fever, whichever happens last.    Please drink plenty of fluids.    Please get plenty of rest.    Please return here or go to the Emergency Department for any concerns or worsening of condition.    Tamiflu prescription has been discussed and if prescribed, please take to completion unless you cannot tolerate the side effects.       Take tylenol (acetominophen) for fever, chills or body aches every 4 hours. do not exceed 4000 mg/ day.    Take Motrin (Ibuprofen) every 4 hours for fever, chills, pain or inflammation.    Use an antihistmine such as claritin or zyrtec to dry you out. Use pseudoephedrine (behind the counter) to decongest (beware this can raise your blood pressure). Use mucinex (guaifenisin) to break up mucous    Use over the counter flonase: one spray each nostril twice daily OR two sprays each nostril once  daily.   If you find this dries your nose out or your nose bleeds, try using over the counter nasal saline a few minutes prior to using the flonase to moisten the lining of your nose.     Please return or see your primary care doctor if you develop new or worsening symptoms.     Please arrange follow up with your primary medical clinic as soon as possible. You must understand that you've received an Urgent Care treatment only and that you may be released before all of your medical problems are known or treated. You, the patient, will arrange for follow up as instructed. If your symptoms worsen or fail to improve you should go to the Emergency Room.  WE CANNOT RULE OUT ALL POSSIBLE CAUSES OF YOUR SYMPTOMS IN THE URGENT CARE SETTING PLEASE GO TO THE ER IF YOU FEELS YOUR CONDITION IS WORSENING OR YOU WOULD LIKE EMERGENT EVALUATION.

## 2023-11-22 NOTE — LETTER
November 22, 2023      [facility]  [address] [address]  Phone: [phone]  Fax: [phone]       Patient: Kevin Urbina   YOB: 1965  Date of Visit: 11/22/2023    To Whom It May Concern:    Lara Urbina  was at [facility] on 11/22/2023. The patient may return to work on 11/26/2023 with no restrictions. If you have any questions or concerns, or if I can be of further assistance, please do not hesitate to contact me.    Sincerely,    Erna Lomas MA

## 2024-01-22 ENCOUNTER — HOSPITAL ENCOUNTER (EMERGENCY)
Facility: HOSPITAL | Age: 59
Discharge: HOME | End: 2024-01-22
Attending: STUDENT IN AN ORGANIZED HEALTH CARE EDUCATION/TRAINING PROGRAM
Payer: MEDICAID

## 2024-01-22 VITALS
DIASTOLIC BLOOD PRESSURE: 78 MMHG | WEIGHT: 163 LBS | TEMPERATURE: 98 F | RESPIRATION RATE: 16 BRPM | SYSTOLIC BLOOD PRESSURE: 137 MMHG | OXYGEN SATURATION: 100 % | BODY MASS INDEX: 25.53 KG/M2 | HEART RATE: 88 BPM

## 2024-01-22 DIAGNOSIS — R41.82 AMS (ALTERED MENTAL STATUS): Primary | ICD-10-CM

## 2024-01-22 DIAGNOSIS — I95.9 HYPOTENSION, UNSPECIFIED HYPOTENSION TYPE: ICD-10-CM

## 2024-01-22 LAB
ALBUMIN SERPL BCP-MCNC: 3.8 G/DL (ref 3.5–5.2)
ALLENS TEST: NORMAL
ALP SERPL-CCNC: 75 U/L (ref 55–135)
ALT SERPL W/O P-5'-P-CCNC: 28 U/L (ref 10–44)
AMPHET+METHAMPHET UR QL: NEGATIVE
ANION GAP SERPL CALC-SCNC: 12 MMOL/L (ref 8–16)
AST SERPL-CCNC: 22 U/L (ref 10–40)
BARBITURATES UR QL SCN>200 NG/ML: NEGATIVE
BASOPHILS # BLD AUTO: 0.06 K/UL (ref 0–0.2)
BASOPHILS NFR BLD: 0.9 % (ref 0–1.9)
BENZODIAZ UR QL SCN>200 NG/ML: NEGATIVE
BILIRUB SERPL-MCNC: 0.9 MG/DL (ref 0.1–1)
BILIRUB UR QL STRIP: NEGATIVE
BUN SERPL-MCNC: 19 MG/DL (ref 6–20)
BUPRENORPHINE UR QL SCN: NEGATIVE
BZE UR QL SCN: NEGATIVE
CALCIUM SERPL-MCNC: 9.1 MG/DL (ref 8.7–10.5)
CANNABINOIDS UR QL SCN: NEGATIVE
CHLORIDE SERPL-SCNC: 110 MMOL/L (ref 95–110)
CLARITY UR REFRACT.AUTO: CLEAR
CO2 SERPL-SCNC: 20 MMOL/L (ref 23–29)
COLOR UR AUTO: YELLOW
CREAT SERPL-MCNC: 1.4 MG/DL (ref 0.5–1.4)
CREAT UR-MCNC: 106 MG/DL (ref 23–375)
DIFFERENTIAL METHOD BLD: ABNORMAL
EOSINOPHIL # BLD AUTO: 0.1 K/UL (ref 0–0.5)
EOSINOPHIL NFR BLD: 1.8 % (ref 0–8)
ERYTHROCYTE [DISTWIDTH] IN BLOOD BY AUTOMATED COUNT: 12.8 % (ref 11.5–14.5)
EST. GFR  (NO RACE VARIABLE): 57.5 ML/MIN/1.73 M^2
ETHANOL UR-MCNC: 48 MG/DL
FENTANYL UR QL SCN: NEGATIVE
GLUCOSE SERPL-MCNC: 105 MG/DL (ref 70–110)
GLUCOSE UR QL STRIP: NEGATIVE
HCT VFR BLD AUTO: 45.2 % (ref 40–54)
HCV AB SERPL QL IA: NORMAL
HGB BLD-MCNC: 15 G/DL (ref 14–18)
HGB UR QL STRIP: NEGATIVE
IMM GRANULOCYTES # BLD AUTO: 0.05 K/UL (ref 0–0.04)
IMM GRANULOCYTES NFR BLD AUTO: 0.8 % (ref 0–0.5)
KETONES UR QL STRIP: NEGATIVE
LACTATE SERPL-SCNC: 3.2 MMOL/L (ref 0.5–2.2)
LDH SERPL L TO P-CCNC: 1.64 MMOL/L (ref 0.5–2.2)
LEUKOCYTE ESTERASE UR QL STRIP: NEGATIVE
LYMPHOCYTES # BLD AUTO: 2.1 K/UL (ref 1–4.8)
LYMPHOCYTES NFR BLD: 32 % (ref 18–48)
MCH RBC QN AUTO: 29.7 PG (ref 27–31)
MCHC RBC AUTO-ENTMCNC: 33.2 G/DL (ref 32–36)
MCV RBC AUTO: 90 FL (ref 82–98)
METHADONE UR QL SCN>300 NG/ML: NEGATIVE
MONOCYTES # BLD AUTO: 0.7 K/UL (ref 0.3–1)
MONOCYTES NFR BLD: 10.3 % (ref 4–15)
NEUTROPHILS # BLD AUTO: 3.6 K/UL (ref 1.8–7.7)
NEUTROPHILS NFR BLD: 54.2 % (ref 38–73)
NITRITE UR QL STRIP: NEGATIVE
NRBC BLD-RTO: 0 /100 WBC
OPIATES UR QL SCN: NEGATIVE
OXYCODONE UR QL SCN: NEGATIVE
PCP UR QL SCN>25 NG/ML: NEGATIVE
PH UR STRIP: 6 [PH] (ref 5–8)
PLATELET # BLD AUTO: 255 K/UL (ref 150–450)
PMV BLD AUTO: 11 FL (ref 9.2–12.9)
POCT GLUCOSE: 118 MG/DL (ref 70–110)
POTASSIUM SERPL-SCNC: 3.6 MMOL/L (ref 3.5–5.1)
PROT SERPL-MCNC: 7.1 G/DL (ref 6–8.4)
PROT UR QL STRIP: ABNORMAL
RBC # BLD AUTO: 5.05 M/UL (ref 4.6–6.2)
SAMPLE: NORMAL
SITE: NORMAL
SODIUM SERPL-SCNC: 142 MMOL/L (ref 136–145)
SP GR UR STRIP: 1.01 (ref 1–1.03)
TOXICOLOGY INFORMATION: ABNORMAL
TRAMADOL UR QL SCN: NEGATIVE
TROPONIN I SERPL DL<=0.01 NG/ML-MCNC: <0.006 NG/ML (ref 0–0.03)
TSH SERPL DL<=0.005 MIU/L-ACNC: 2.67 UIU/ML (ref 0.4–4)
URN SPEC COLLECT METH UR: ABNORMAL
WBC # BLD AUTO: 6.62 K/UL (ref 3.9–12.7)

## 2024-01-22 PROCEDURE — 82962 GLUCOSE BLOOD TEST: CPT

## 2024-01-22 PROCEDURE — 80183 DRUG SCRN QUANT OXCARBAZEPIN: CPT | Performed by: STUDENT IN AN ORGANIZED HEALTH CARE EDUCATION/TRAINING PROGRAM

## 2024-01-22 PROCEDURE — 80354 DRUG SCREENING FENTANYL: CPT

## 2024-01-22 PROCEDURE — 93010 ELECTROCARDIOGRAM REPORT: CPT | Mod: ,,, | Performed by: INTERNAL MEDICINE

## 2024-01-22 PROCEDURE — 83605 ASSAY OF LACTIC ACID: CPT | Performed by: STUDENT IN AN ORGANIZED HEALTH CARE EDUCATION/TRAINING PROGRAM

## 2024-01-22 PROCEDURE — 93005 ELECTROCARDIOGRAM TRACING: CPT

## 2024-01-22 PROCEDURE — 96374 THER/PROPH/DIAG INJ IV PUSH: CPT

## 2024-01-22 PROCEDURE — 80053 COMPREHEN METABOLIC PANEL: CPT | Performed by: STUDENT IN AN ORGANIZED HEALTH CARE EDUCATION/TRAINING PROGRAM

## 2024-01-22 PROCEDURE — 84484 ASSAY OF TROPONIN QUANT: CPT | Performed by: STUDENT IN AN ORGANIZED HEALTH CARE EDUCATION/TRAINING PROGRAM

## 2024-01-22 PROCEDURE — 99285 EMERGENCY DEPT VISIT HI MDM: CPT | Mod: 25

## 2024-01-22 PROCEDURE — 85025 COMPLETE CBC W/AUTO DIFF WBC: CPT | Performed by: STUDENT IN AN ORGANIZED HEALTH CARE EDUCATION/TRAINING PROGRAM

## 2024-01-22 PROCEDURE — 80373 DRUG SCREENING TRAMADOL: CPT

## 2024-01-22 PROCEDURE — 86803 HEPATITIS C AB TEST: CPT | Performed by: PHYSICIAN ASSISTANT

## 2024-01-22 PROCEDURE — 80307 DRUG TEST PRSMV CHEM ANLYZR: CPT

## 2024-01-22 PROCEDURE — 80348 DRUG SCREENING BUPRENORPHINE: CPT

## 2024-01-22 PROCEDURE — 80365 DRUG SCREENING OXYCODONE: CPT

## 2024-01-22 PROCEDURE — 84443 ASSAY THYROID STIM HORMONE: CPT | Performed by: STUDENT IN AN ORGANIZED HEALTH CARE EDUCATION/TRAINING PROGRAM

## 2024-01-22 PROCEDURE — 81003 URINALYSIS AUTO W/O SCOPE: CPT | Mod: 59 | Performed by: STUDENT IN AN ORGANIZED HEALTH CARE EDUCATION/TRAINING PROGRAM

## 2024-01-22 PROCEDURE — 83605 ASSAY OF LACTIC ACID: CPT

## 2024-01-22 PROCEDURE — 99900035 HC TECH TIME PER 15 MIN (STAT)

## 2024-01-22 PROCEDURE — 96361 HYDRATE IV INFUSION ADD-ON: CPT

## 2024-01-22 PROCEDURE — 63600175 PHARM REV CODE 636 W HCPCS: Performed by: STUDENT IN AN ORGANIZED HEALTH CARE EDUCATION/TRAINING PROGRAM

## 2024-01-22 RX ORDER — FLUOXETINE HYDROCHLORIDE 40 MG/1
40 CAPSULE ORAL DAILY
COMMUNITY

## 2024-01-22 RX ORDER — NALOXONE HCL 0.4 MG/ML
0.4 VIAL (ML) INJECTION
Status: COMPLETED | OUTPATIENT
Start: 2024-01-22 | End: 2024-01-22

## 2024-01-22 RX ORDER — NALOXONE HCL 0.4 MG/ML
1 VIAL (ML) INJECTION
Status: COMPLETED | OUTPATIENT
Start: 2024-01-22 | End: 2024-01-22

## 2024-01-22 RX ADMIN — NALXONE HYDROCHLORIDE 0.4 MG: 0.4 INJECTION INTRAMUSCULAR; INTRAVENOUS; SUBCUTANEOUS at 04:01

## 2024-01-22 RX ADMIN — NALXONE HYDROCHLORIDE 1 MG: 0.4 INJECTION INTRAMUSCULAR; INTRAVENOUS; SUBCUTANEOUS at 04:01

## 2024-01-22 NOTE — Clinical Note
"Kevin Dietrich" Carlitos was seen and treated in our emergency department on 1/22/2024.  He may return to work on 01/24/2024.       If you have any questions or concerns, please don't hesitate to call.       RN    "

## 2024-01-22 NOTE — CODE/ RAPID DOCUMENTATION
"RAPID RESPONSE RESPIRATORY THERAPY CODE BLUE NOTE             Code Status: No Order   : 1965  Bed: ED :   MRN: [id number]  Time page Received: 1613  Time Rapid Response RT at Bedside: 1614  Time Rapid Response RT left Bedside: 1625    SITUATION    Evaluated patient for: Code Blue    BACKGROUND    Why is the patient in the hospital?: <principal problem not specified>    Patient has a past medical history of Cocaine abuse, GERD (gastroesophageal reflux disease), unilateral nephrectomy, Hypertension, Mood disorder with psychosis, and Tobacco abuse.    24 Hours Vitals Range:  Temp:  [97.8 °F (36.6 °C)]   Pulse:  [71]   Resp:  [10]   BP: (89)/(59)   SpO2:  [96 %]     Labs:    No results for input(s): "NA", "K", "CL", "CO2", "BUN", "CREATININE", "GLU", "PHOS", "MG" in the last 72 hours.    Invalid input(s): "CMP", "TBIL"     No results for input(s): "PH", "PCO2", "PO2", "HCO3", "POCSATURATED", "BE" in the last 72 hours.    ASSESSMENT/INTERVENTIONS    Patient sitting on boxes in paper room of [facility]. Patient leaning towards the right side. O2 SAT 96% on room air. Patient alert and oriented to time. Pulse never lost. No respiratory concerns. Patient transported via wheelchair to ED.    Was the patient on NIPPV prior to code?: No  Does the patient have a surgical airway: No  Was the patient intubated? No  ETCO2 monitored: no  Ambu at bedside:       Please see Code Blue Documentation for more details.    OUTCOME    Disposition: Tx to ER bed 11.    RT CODE TEAM MEMBERS    RRSRT: Ivis Goetz, RRT, [id number]    Additional RTs: Roxann Waller RRT             "

## 2024-01-22 NOTE — ED PROVIDER NOTES
Encounter Date: 1/22/2024       History     Chief Complaint   Patient presents with    Altered Mental Status     Arrives with [place] from kitchen for AMS. Pt able to open eyes, but appears lethargic. Pupils pin point. Denies taking drugs.     58-year-old male with unknown PMH presents with decreased responsiveness.  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  He is unable to provide almost any history but denies taking any drugs when we are able to wake him up enough.  A rapid response was called.  Per rapid response nurse, patient was able to open his eyes but was very lethargic and they noticed his pupils were pinpoint.  No history able to be obtained otherwise.    The history is provided by the patient. The history is limited by the condition of the patient.     Review of patient's allergies indicates:   Allergen Reactions    No known drug allergies      Past Medical History:   Diagnosis Date    Cocaine abuse 10/2022    s/p Admit to [facility]    GERD (gastroesophageal reflux disease)     Hx of unilateral nephrectomy     s/p Left Nephrectomy 12/2010/Benign cyst/inflammation    Hypertension     Mood disorder with psychosis 10/12/2022    Admission [facility] Tobacco abuse      Past Surgical History:   Procedure Laterality Date    KIDNEY SURGERY  2012    kidney removed     Family History   Problem Relation Age of Onset    Hypertension Mother     Diabetes Mother     Cancer Mother         Unknown Cancer type    Hypertension Father      Social History     Tobacco Use    Smoking status: Every Day     Current packs/day: 0.50     Average packs/day: 0.5 packs/day for 40.0 years (20.0 ttl pk-yrs)     Types: Cigarettes    Smokeless tobacco: Never    Tobacco comments:     Considering such   Substance Use Topics    Alcohol use: Yes     Alcohol/week: 13.0 standard drinks of alcohol     Types: 7 Glasses of wine, 6 Cans of beer per week     Comment: Drinks 6 oz  [place] 3x/week    Drug use: Yes     Types: Cocaine     Review of Systems    Physical Exam     Initial Vitals [01/22/24 1625]   BP Pulse Resp Temp SpO2   (!) 89/59 71 10 97.8 °F (36.6 °C) 96 %      MAP       --         Physical Exam    Nursing note and vitals reviewed.  Constitutional: He appears well-developed and well-nourished. He is not diaphoretic.   HENT:   Head: Normocephalic and atraumatic.   Eyes: Conjunctivae and EOM are normal.   Pinpoint pupils bilaterally   Neck: Neck supple.   Normal range of motion.  Cardiovascular:  Normal rate, regular rhythm, normal heart sounds and intact distal pulses.           No murmur heard.  Pulmonary/Chest: Breath sounds normal. No respiratory distress. He has no wheezes. He has no rhonchi. He has no rales.   Abdominal: Abdomen is soft. He exhibits no distension. There is no abdominal tenderness. There is no rebound and no guarding.   Musculoskeletal:         General: No tenderness or edema.      Cervical back: Normal range of motion and neck supple.     Neurological:   Patient wakes up to noxious stimuli but pretty quickly falls back asleep.  Able to move all extremities.  With constant stimulation, he is able to stand with only a little assistance in order to transfer from the wheelchair to a bed.   Skin: Skin is warm and dry. Capillary refill takes less than 2 seconds.         ED Course   Procedures  Labs Reviewed   CBC W/ AUTO DIFFERENTIAL - Abnormal; Notable for the following components:       Result Value    Immature Granulocytes 0.8 (*)     Immature Grans (Abs) 0.05 (*)     All other components within normal limits    Narrative:     Release to patient->Immediate   COMPREHENSIVE METABOLIC PANEL - Abnormal; Notable for the following components:    CO2 20 (*)     eGFR 57.5 (*)     All other components within normal limits    Narrative:     Release to patient->Immediate   URINALYSIS, REFLEX TO URINE CULTURE - Abnormal; Notable for the following components:     Protein, UA Trace (*)     All other components within normal limits    Narrative:     Specimen Source->Urine   LACTIC ACID, PLASMA - Abnormal; Notable for the following components:    Lactate (Lactic Acid) 3.2 (*)     All other components within normal limits    Narrative:     Release to patient->Immediate   TOXICOLOGY SCREEN, URINE, RANDOM (COMPLIANCE) - Abnormal; Notable for the following components:    Alcohol, Urine 48 (*)     All other components within normal limits    Narrative:     Specimen Source->Urine   POCT GLUCOSE - Abnormal; Notable for the following components:    POCT Glucose 118 (*)     All other components within normal limits   HEPATITIS C ANTIBODY    Narrative:     Release to patient->Immediate   TROPONIN I    Narrative:     Release to patient->Immediate   TSH    Narrative:     Release to patient->Immediate   FENTANYL, URINE    Narrative:     Specimen Source->Urine   BUPRENORPHINE, URINE    Narrative:     Specimen Source->Urine   URINE OXYCODONE    Narrative:     Specimen Source->Urine   TRAMADOL, URINE    Narrative:     Specimen Source->Urine   OXCARBAZEPINE METABOLITE (MHC)   ISTAT LACTATE        ECG Results              EKG 12-lead (Final result)  Result time 01/23/24 08:22:54      Final result by Interface, Lab In [facility] (01/23/24 08:22:54)                   Narrative:    Test Reason : R41.82,    Vent. Rate : 069 BPM     Atrial Rate : 069 BPM     P-R Int : 144 ms          QRS Dur : 084 ms      QT Int : 412 ms       P-R-T Axes : 066 030 050 degrees     QTc Int : 441 ms    Normal sinus rhythm  Low septal forces  Otherwise Normal ECG  When compared with ECG of 18-NOV-2010 10:10,  Septal forces less now with Q V2  Nonspecific T wave abnormality now evident in Anterior-lateral leads  Confirmed by Víctor PERALTA MD ([id number]) on 1/23/2024 8:22:42 AM    Referred By: WAYLON   SELF           Confirmed By:Víctor PERALTA MD                                  Imaging Results              CT Head Without Contrast  (Final result)  Result time 01/22/24 20:54:45      Final result by Scott Lazcano MD (01/22/24 20:54:45)                   Impression:      No acute intracranial process.  Additional evaluation, as clinically warranted.    Electronically signed by resident: Gonzalo Dahl  Date:    01/22/2024  Time:    20:33    Electronically signed by: Scott Lazcano MD  Date:    01/22/2024  Time:    20:54               Narrative:    EXAMINATION:  CT HEAD WITHOUT CONTRAST    CLINICAL HISTORY:  Mental status change, unknown cause;    TECHNIQUE:  Low dose axial CT images obtained throughout the head without intravenous contrast. Sagittal and coronal reconstructions were performed.    COMPARISON:  None.    FINDINGS:  Intracranial compartment:    Ventricles and sulci are normal in size for age without evidence of hydrocephalus. No extra-axial blood or fluid collections.    The brain parenchyma appears with normal attenuation.  No parenchymal mass effect, hemorrhage, edema or recent major vascular distribution infarct.    Skull/extracranial contents (limited evaluation): No displaced calvarial fracture.  Mastoid air cells and paranasal sinuses are essentially clear.  Mame bullosa.                                       X-Ray Chest AP Portable (Final result)  Result time 01/22/24 18:15:02      Final result by Constantine Gutierrez MD (01/22/24 18:15:02)                   Impression:      No evidence of acute chest disease.      Electronically signed by: Constantine Gutierrez  Date:    01/22/2024  Time:    18:15               Narrative:    EXAMINATION:  XR CHEST AP PORTABLE    CLINICAL HISTORY:  narcan, positive resopnse;    TECHNIQUE:  Single frontal view of the chest was performed.    COMPARISON:  08/04/2015 chest and ribs.    FINDINGS:  The heart is not enlarged the trachea is midline.  The lungs and pleural spaces are clear.  Previous left rib fractures appear healed.                                       Medications   naloxone 0.4 mg/mL  injection 0.4 mg (0.4 mg Intravenous Given 1/22/24 1628)   sodium chloride 0.9% bolus 1,000 mL 1,000 mL (0 mLs Intravenous Stopped 1/22/24 1818)   naloxone 0.4 mg/mL injection 1 mg (1 mg Intravenous Given 1/22/24 1639)   sodium chloride 0.9% bolus 1,000 mL 1,000 mL (0 mLs Intravenous Stopped 1/22/24 1930)     Medical Decision Making  Differential diagnoses considered includes opiate overdose, substance abuse, ICH, electrolyte abnormality, IRAJ, ACS, UTI, syncope, dysrhythmia    Upon initial evaluation of the wheelchair, the patient was significantly less responsive and had pinpoint pupils, so we administered Narcan 0.4 mg IV.  It did seem like he had a mild positive response and once he was in the room he seemed less responsive again, so we gave Narcan 1 mg IV.  However, he did not seem to respond to this and his breathing did not seem depressed, so we did not administer additional Narcan and I suspected opiates were not the cause of his AMS.    Patient initially hypotensive so I gave 1 L of normal saline.    See ED course for additional information    Amount and/or Complexity of Data Reviewed  External Data Reviewed: notes.     Details: On chart review, patient has a general practitioner note with a diagnosis of adjustment disorder with anxiety and depression  Labs: ordered. Decision-making details documented in ED Course.  Radiology: ordered and independent interpretation performed. Decision-making details documented in ED Course.  ECG/medicine tests: ordered and independent interpretation performed. Decision-making details documented in ED Course.    Risk  Prescription drug management.  Diagnosis or treatment significantly limited by social determinants of health.               ED Course as of 01/24/24 2211 Mon Jan 22, 2024   1709 EKG 12-lead  EKG independently interpreted by me shows normal sinus rhythm at a rate of 69, no STEMI, normal intervals, no prior for comparison [BD]   1938 POCT Glucose(!): 118  Normal  [BD]   1938 CBC auto differential(!)  CBC unremarkable without leukocytosis, significant anemia, or decreased platelets   [BD]   1938 Comprehensive metabolic panel(!)  CMP unremarkable without significant electrolyte derangement, impaired renal function, or elevated LFTs   [BD]   1938 Lactate, Lewis(!): 3.2  Slightly elevated lactate, though infection thought less likely.  We will give another L of fluids  [BD]   1938 TSH: 2.665  Normal [BD]   1938 Troponin I: <0.006  ACS unlikely [BD]   1939 X-Ray Chest AP Portable  Chest x-ray independently interpreted by me shows no acute process such as pneumonia, pneumothorax, or pulmonary edema.    [BD]   2145 CT Head Without Contrast  CT Head unremarkable without evidence of large-vessel infarct or intracranial hemorrhage   [BD]   2145 Urinalysis, Reflex to Urine Culture Urine, Clean Catch(!)  UA unremarkable without evidence of infection or hematuria   [BD]   2146 OXYCODONE: Negative [BD]   2146 Fentanyl, Urine: Negative [BD]   2146 Tramadol, Urine: Negative [BD]   2146 BUPRENORPHINE: Negative [BD]   2146 POC Lactate: 1.64  Normal [BD]   2146 Patient was returned to his baseline.  Patient's wife is bedside and she states that he told her he took a sleeping medication by accident because he was caring 2 different pills with him.  Patient is confident that he took the sleeping pill after which he said he fell asleep.  He denies taking any medications that he has not prescribed.  Suspect medication side effects/accidental overdose versus alcohol intoxication, though I still do not have a clear etiology of the episode.  Patient does state that he drank heavily last night but denies drinking anything at work.  His wife is bedside .  Patient is able to ambulate independently.  Patient will be discharged at this time. Patient has been given home care instructions, follow up instructions, and strict return precautions. They agree with and are comfortable with the plan.     Procedure:  Critical Care  Please put in 30 minutes of critical care due to patient having a high risk of neurological failure.           [BD]      ED Course User Index  [BD] Chico Donaldson MD                           Clinical Impression:  Final diagnoses:  [R41.82] AMS (altered mental status) (Primary)  [I95.9] Hypotension, unspecified hypotension type          ED Disposition Condition    Discharge Stable          ED Prescriptions    None       Follow-up Information       Follow up With Specialties Details Why Contact Info    Josh Briones MD Internal Medicine Schedule an appointment as soon as possible for a visit  To discuss your recent ER visit and any additional concerns that you may have [address]  [facility] [address], [address]  [facility] [address]  [phone]      [facility] Emergency Dept Emergency Medicine Go to  As needed, If symptoms worsen [address] [facility] [id number] [phone]             Chico Donaldson MD  01/24/24 6700

## 2024-01-22 NOTE — CODE/ RAPID DOCUMENTATION
RAPID RESPONSE NURSE NOTE        Admit Date: (Not on file)  LOS: 0  Code Status: No Order   Date of Consult: 2024  : 1965  Age: 58 y.o.  Weight:   Wt Readings from Last 1 Encounters:   23 74 kg (163 lb 2.3 oz)     Sex: male  Race: Black or    Bed: Room/bed info not found:   MRN: [id number]  Time Rapid Response Team page Received: 1555  Time Rapid Response Team at Bedside: 1558  Time Rapid Response Team left Bedside: 1615  Was the patient discharged from an ICU this admission? No  Was the patient discharged from a PACU within last 24 hours? No   Did the patient receive conscious sedation/general anesthesia in last 24 hours? No  Was the patient in the ED within the past 24 hours? No  Was the patient on NIPPV within the past 24 hours? No   Did this progress into an ARC or CPA: No  Attending Physician: KIMBERLY  Primary Service: NA    SITUATION    Notified by overhead page.  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    ASSESSMENT/INTERVENTIONS    What did you find: Patient sitting in Paper room leaning towards Right side. Pin point pupils. Patient alert to self and time. Placed patient in wheelchair. Transferred to ED by rapid response team    RECOMMENDATIONS    We recommend: ED transfer and evaluation.        FOLLOW UP    Call the Rapid Response Nurse, Cuong Metzger RN at [phone] for additional questions or concerns.

## 2024-01-23 NOTE — ED NOTES
Alert to person and place. Lethargic.Maintained on cardiac monitor .in NSR--family at bedside. Side rails up--urinal given

## 2024-01-25 LAB — OXCARBAZEPINE METABOLITE: <1 MCG/ML (ref 10–35)

## 2024-02-07 DIAGNOSIS — E11.9 TYPE 2 DIABETES MELLITUS WITHOUT COMPLICATION: ICD-10-CM

## 2024-07-20 ENCOUNTER — HOSPITAL ENCOUNTER (EMERGENCY)
Facility: OTHER | Age: 59
Discharge: PSYCHIATRIC OR SUBSTANCE USE FACILITY | End: 2024-07-20
Attending: EMERGENCY MEDICINE

## 2024-07-20 VITALS
OXYGEN SATURATION: 98 % | DIASTOLIC BLOOD PRESSURE: 71 MMHG | RESPIRATION RATE: 18 BRPM | HEART RATE: 80 BPM | TEMPERATURE: 98 F | SYSTOLIC BLOOD PRESSURE: 131 MMHG

## 2024-07-20 DIAGNOSIS — R45.850 HOMICIDAL IDEATION: ICD-10-CM

## 2024-07-20 DIAGNOSIS — R45.851 SUICIDAL IDEATION: Primary | ICD-10-CM

## 2024-07-20 DIAGNOSIS — F22 PARANOIA: ICD-10-CM

## 2024-07-20 DIAGNOSIS — F29 PSYCHOSIS, UNSPECIFIED PSYCHOSIS TYPE: ICD-10-CM

## 2024-07-20 LAB
ALBUMIN SERPL BCP-MCNC: 4.6 G/DL (ref 3.5–5.2)
ALP SERPL-CCNC: 77 U/L (ref 55–135)
ALT SERPL W/O P-5'-P-CCNC: 36 U/L (ref 10–44)
AMPHET+METHAMPHET UR QL: NEGATIVE
ANION GAP SERPL CALC-SCNC: 17 MMOL/L (ref 8–16)
APAP SERPL-MCNC: <3 UG/ML (ref 10–20)
AST SERPL-CCNC: 28 U/L (ref 10–40)
BACTERIA #/AREA URNS HPF: NORMAL /HPF
BARBITURATES UR QL SCN>200 NG/ML: NEGATIVE
BASOPHILS # BLD AUTO: 0.06 K/UL (ref 0–0.2)
BASOPHILS NFR BLD: 0.5 % (ref 0–1.9)
BENZODIAZ UR QL SCN>200 NG/ML: NEGATIVE
BILIRUB SERPL-MCNC: 0.7 MG/DL (ref 0.1–1)
BILIRUB UR QL STRIP: NEGATIVE
BUN SERPL-MCNC: 15 MG/DL (ref 8–23)
BZE UR QL SCN: ABNORMAL
CALCIUM SERPL-MCNC: 9.8 MG/DL (ref 8.7–10.5)
CANNABINOIDS UR QL SCN: NEGATIVE
CHLORIDE SERPL-SCNC: 107 MMOL/L (ref 95–110)
CK SERPL-CCNC: 510 U/L (ref 20–200)
CLARITY UR: CLEAR
CO2 SERPL-SCNC: 19 MMOL/L (ref 23–29)
COLOR UR: YELLOW
CREAT SERPL-MCNC: 1.2 MG/DL (ref 0.5–1.4)
CREAT UR-MCNC: 116 MG/DL (ref 23–375)
DIFFERENTIAL METHOD BLD: ABNORMAL
EOSINOPHIL # BLD AUTO: 0 K/UL (ref 0–0.5)
EOSINOPHIL NFR BLD: 0.1 % (ref 0–8)
ERYTHROCYTE [DISTWIDTH] IN BLOOD BY AUTOMATED COUNT: 13 % (ref 11.5–14.5)
EST. GFR  (NO RACE VARIABLE): >60 ML/MIN/1.73 M^2
ETHANOL SERPL-MCNC: 138 MG/DL
ETHANOL SERPL-MCNC: 44 MG/DL
GLUCOSE SERPL-MCNC: 143 MG/DL (ref 70–110)
GLUCOSE UR QL STRIP: NEGATIVE
HCT VFR BLD AUTO: 46.1 % (ref 40–54)
HGB BLD-MCNC: 16.2 G/DL (ref 14–18)
HGB UR QL STRIP: NEGATIVE
HYALINE CASTS #/AREA URNS LPF: 0 /LPF
IMM GRANULOCYTES # BLD AUTO: 0.1 K/UL (ref 0–0.04)
IMM GRANULOCYTES NFR BLD AUTO: 0.8 % (ref 0–0.5)
KETONES UR QL STRIP: NEGATIVE
LEUKOCYTE ESTERASE UR QL STRIP: NEGATIVE
LYMPHOCYTES # BLD AUTO: 1.8 K/UL (ref 1–4.8)
LYMPHOCYTES NFR BLD: 13.5 % (ref 18–48)
MAGNESIUM SERPL-MCNC: 2 MG/DL (ref 1.6–2.6)
MCH RBC QN AUTO: 30 PG (ref 27–31)
MCHC RBC AUTO-ENTMCNC: 35.1 G/DL (ref 32–36)
MCV RBC AUTO: 85 FL (ref 82–98)
METHADONE UR QL SCN>300 NG/ML: NEGATIVE
MICROSCOPIC COMMENT: NORMAL
MONOCYTES # BLD AUTO: 1.4 K/UL (ref 0.3–1)
MONOCYTES NFR BLD: 10.5 % (ref 4–15)
NEUTROPHILS # BLD AUTO: 9.9 K/UL (ref 1.8–7.7)
NEUTROPHILS NFR BLD: 74.6 % (ref 38–73)
NITRITE UR QL STRIP: NEGATIVE
NRBC BLD-RTO: 0 /100 WBC
OPIATES UR QL SCN: NEGATIVE
PCP UR QL SCN>25 NG/ML: NEGATIVE
PH UR STRIP: 5 [PH] (ref 5–8)
PLATELET # BLD AUTO: 313 K/UL (ref 150–450)
PMV BLD AUTO: 10.4 FL (ref 9.2–12.9)
POTASSIUM SERPL-SCNC: 3.2 MMOL/L (ref 3.5–5.1)
PROT SERPL-MCNC: 8.2 G/DL (ref 6–8.4)
PROT UR QL STRIP: ABNORMAL
RBC # BLD AUTO: 5.4 M/UL (ref 4.6–6.2)
RBC #/AREA URNS HPF: 1 /HPF (ref 0–4)
SODIUM SERPL-SCNC: 143 MMOL/L (ref 136–145)
SP GR UR STRIP: 1.02 (ref 1–1.03)
TOXICOLOGY INFORMATION: ABNORMAL
TSH SERPL DL<=0.005 MIU/L-ACNC: 2.04 UIU/ML (ref 0.4–4)
URN SPEC COLLECT METH UR: ABNORMAL
UROBILINOGEN UR STRIP-ACNC: NEGATIVE EU/DL
WBC # BLD AUTO: 13.2 K/UL (ref 3.9–12.7)
WBC #/AREA URNS HPF: 1 /HPF (ref 0–5)

## 2024-07-20 PROCEDURE — 81000 URINALYSIS NONAUTO W/SCOPE: CPT

## 2024-07-20 PROCEDURE — 82550 ASSAY OF CK (CPK): CPT

## 2024-07-20 PROCEDURE — 80143 DRUG ASSAY ACETAMINOPHEN: CPT

## 2024-07-20 PROCEDURE — 25000003 PHARM REV CODE 250

## 2024-07-20 PROCEDURE — 99285 EMERGENCY DEPT VISIT HI MDM: CPT | Mod: 25

## 2024-07-20 PROCEDURE — 83735 ASSAY OF MAGNESIUM: CPT

## 2024-07-20 PROCEDURE — 80053 COMPREHEN METABOLIC PANEL: CPT

## 2024-07-20 PROCEDURE — 96360 HYDRATION IV INFUSION INIT: CPT

## 2024-07-20 PROCEDURE — 93010 ELECTROCARDIOGRAM REPORT: CPT | Mod: ,,, | Performed by: INTERNAL MEDICINE

## 2024-07-20 PROCEDURE — 84443 ASSAY THYROID STIM HORMONE: CPT

## 2024-07-20 PROCEDURE — 85025 COMPLETE CBC W/AUTO DIFF WBC: CPT

## 2024-07-20 PROCEDURE — 82077 ASSAY SPEC XCP UR&BREATH IA: CPT | Mod: 91

## 2024-07-20 PROCEDURE — 80307 DRUG TEST PRSMV CHEM ANLYZR: CPT

## 2024-07-20 PROCEDURE — 93005 ELECTROCARDIOGRAM TRACING: CPT

## 2024-07-20 RX ORDER — OLANZAPINE 5 MG/1
20 TABLET ORAL
Status: COMPLETED | OUTPATIENT
Start: 2024-07-20 | End: 2024-07-20

## 2024-07-20 RX ADMIN — OLANZAPINE 20 MG: 5 TABLET, FILM COATED ORAL at 02:07

## 2024-07-20 RX ADMIN — SODIUM CHLORIDE 1000 ML: 9 INJECTION, SOLUTION INTRAVENOUS at 02:07

## 2024-07-20 NOTE — ED NOTES
"Attempted to call and update spouse twice. No one answered and stated "the number you tried to call is unreachable". Pt does not remember phone number.   "
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
## 2024-07-20 NOTE — ED NOTES
Pt belongings:  1Black pants   1Black belt   1Black shirt with white letters   1Black hat     Pt valuables:   1 Blue phone   1 black phone   1 black wallet with various cards   1 green lighter
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
## 2024-07-21 LAB
OHS QRS DURATION: 86 MS
OHS QTC CALCULATION: 426 MS

## 2024-07-22 PROBLEM — Z90.5 SOLITARY KIDNEY, ACQUIRED: Status: ACTIVE | Noted: 2024-07-22

## 2024-07-24 ENCOUNTER — TELEPHONE (OUTPATIENT)
Dept: INTERNAL MEDICINE | Facility: CLINIC | Age: 59
End: 2024-07-24

## 2024-07-24 NOTE — TELEPHONE ENCOUNTER
Cris/Eduardo, can you call Amy([facility]) to schedule pt a Hospital follow up on a Tuesday afternoon sometime in the next 2-3 weeks please.  Thanks

## 2024-07-25 PROBLEM — F43.10 PTSD (POST-TRAUMATIC STRESS DISORDER): Status: ACTIVE | Noted: 2024-07-25

## 2024-07-28 ENCOUNTER — TELEPHONE (OUTPATIENT)
Dept: INTERNAL MEDICINE | Facility: CLINIC | Age: 59
End: 2024-07-28

## 2024-07-28 NOTE — TELEPHONE ENCOUNTER
Bronwyn/Eduardo-would you please schedule pt a next available hospital follow up appt in [facility].  Thanks

## 2024-11-21 ENCOUNTER — PATIENT OUTREACH (OUTPATIENT)
Dept: ADMINISTRATIVE | Facility: HOSPITAL | Age: 59
End: 2024-11-21

## 2025-07-11 ENCOUNTER — PATIENT OUTREACH (OUTPATIENT)
Dept: ADMINISTRATIVE | Facility: HOSPITAL | Age: 60
End: 2025-07-11